# Patient Record
Sex: FEMALE | Race: WHITE | Employment: OTHER | URBAN - METROPOLITAN AREA
[De-identification: names, ages, dates, MRNs, and addresses within clinical notes are randomized per-mention and may not be internally consistent; named-entity substitution may affect disease eponyms.]

---

## 2017-05-22 ENCOUNTER — OFFICE VISIT (OUTPATIENT)
Dept: OTOLARYNGOLOGY | Facility: CLINIC | Age: 82
End: 2017-05-22

## 2017-05-22 VITALS
DIASTOLIC BLOOD PRESSURE: 77 MMHG | TEMPERATURE: 98 F | HEIGHT: 59 IN | WEIGHT: 137 LBS | BODY MASS INDEX: 27.62 KG/M2 | SYSTOLIC BLOOD PRESSURE: 117 MMHG

## 2017-05-22 DIAGNOSIS — J34.1 MAXILLARY SINUS CYST: Primary | ICD-10-CM

## 2017-05-22 PROCEDURE — G0463 HOSPITAL OUTPT CLINIC VISIT: HCPCS | Performed by: OTOLARYNGOLOGY

## 2017-05-22 PROCEDURE — 99214 OFFICE O/P EST MOD 30 MIN: CPT | Performed by: OTOLARYNGOLOGY

## 2017-05-23 NOTE — PROGRESS NOTES
Cathy Jordan is a 80year old female. Patient presents with:   Follow - Up: regarding maxillary sinus cyst, pt is doing well       HISTORY OF PRESENT ILLNESS  I have seen her in the past for allergic rhinitis and previous CT scan demonstrating a kavon Negative Blurred vision and vision changes. Respiratory Negative Dyspnea and wheezing. Cardio Negative Chest pain, irregular heartbeat/palpitations and syncope. GI Negative Abdominal pain and diarrhea.    Endocrine Negative Cold intolerance and heat i mouth., Disp: , Rfl:   •  ferrous sulfate 325 (65 FE) MG Oral Tab EC, Take 325 mg by mouth., Disp: , Rfl:   •  Montelukast Sodium 10 MG Oral Tab, Take 1 tablet (10 mg total) by mouth nightly., Disp: 30 tablet, Rfl: 3  •  loratadine 10 MG Oral Tab, Take 1 t

## 2017-05-23 NOTE — PATIENT INSTRUCTIONS
Understanding Nasal Anatomy: Inside View  A lot happens under the surface of the nose. The bone and cartilage under the skin give the nose most of its size and shape. Other structures inside and behind the nose help you breathe.  Learning the anatomy of t Bone supports the upper third (bridge) of the nose. The upper cartilage supports the side of the nose. The lower cartilage adds support, width, and height. It helps shape the nostrils and the tip of the nose.  Skin also helps shape the nose.          The na The nasal cavity is a hollow space behind the nose that air flows through. The septum is a thin \"wall\" made of cartilage and bone. It divides the inside of the nose into two chambers.  The mucous membrane is thin tissue that lines the nose, sinuses, and t

## 2017-09-11 RX ORDER — MONTELUKAST SODIUM 10 MG/1
10 TABLET ORAL NIGHTLY
Qty: 30 TABLET | Refills: 2 | Status: ON HOLD | OUTPATIENT
Start: 2017-09-11 | End: 2019-01-01

## 2017-09-11 RX ORDER — LORATADINE 10 MG/1
10 TABLET ORAL DAILY
Qty: 30 TABLET | Refills: 2 | Status: ON HOLD | OUTPATIENT
Start: 2017-09-11 | End: 2019-01-01

## 2018-03-01 RX ORDER — FLUTICASONE PROPIONATE 50 MCG
1 SPRAY, SUSPENSION (ML) NASAL 2 TIMES DAILY
Qty: 16 G | Refills: 2 | Status: ON HOLD | OUTPATIENT
Start: 2018-03-01 | End: 2019-01-01

## 2018-11-27 ENCOUNTER — HOSPITAL ENCOUNTER (OUTPATIENT)
Dept: ULTRASOUND IMAGING | Facility: HOSPITAL | Age: 83
Discharge: HOME | End: 2018-11-27
Attending: FAMILY MEDICINE
Payer: MEDICARE

## 2018-11-27 DIAGNOSIS — R55 SYNCOPE, UNSPECIFIED SYNCOPE TYPE: ICD-10-CM

## 2018-11-27 PROCEDURE — 93880 EXTRACRANIAL BILAT STUDY: CPT | Performed by: FAMILY MEDICINE

## 2019-01-01 ENCOUNTER — HOSPITAL ENCOUNTER (INPATIENT)
Facility: HOSPITAL | Age: 84
LOS: 4 days | Discharge: NURSING, REHAB OR LONG-TERM CARE | DRG: 505 | End: 2019-01-01
Attending: HOSPITALIST | Admitting: HOSPITALIST
Payer: MEDICARE

## 2019-01-01 ENCOUNTER — APPOINTMENT (OUTPATIENT)
Dept: GENERAL RADIOLOGY | Age: 84
End: 2019-01-01
Attending: FAMILY MEDICINE
Payer: MEDICARE

## 2019-01-01 ENCOUNTER — APPOINTMENT (OUTPATIENT)
Dept: GENERAL RADIOLOGY | Facility: HOSPITAL | Age: 84
DRG: 505 | End: 2019-01-01
Attending: ORTHOPAEDIC SURGERY
Payer: MEDICARE

## 2019-01-01 ENCOUNTER — SNF VISIT (OUTPATIENT)
Dept: INTERNAL MEDICINE CLINIC | Age: 84
End: 2019-01-01

## 2019-01-01 ENCOUNTER — ANESTHESIA EVENT (OUTPATIENT)
Dept: SURGERY | Facility: HOSPITAL | Age: 84
DRG: 505 | End: 2019-01-01
Payer: MEDICARE

## 2019-01-01 ENCOUNTER — SNF ADMIT/H&P (OUTPATIENT)
Dept: FAMILY MEDICINE CLINIC | Facility: CLINIC | Age: 84
End: 2019-01-01

## 2019-01-01 ENCOUNTER — HOSPITAL ENCOUNTER (OUTPATIENT)
Age: 84
Discharge: HOME | End: 2019-01-01
Attending: FAMILY MEDICINE
Payer: MEDICARE

## 2019-01-01 ENCOUNTER — MED REC SCAN ONLY (OUTPATIENT)
Dept: FAMILY MEDICINE CLINIC | Facility: CLINIC | Age: 84
End: 2019-01-01

## 2019-01-01 ENCOUNTER — MOBILE ENCOUNTER (OUTPATIENT)
Dept: FAMILY MEDICINE CLINIC | Facility: CLINIC | Age: 84
End: 2019-01-01

## 2019-01-01 ENCOUNTER — ANESTHESIA (OUTPATIENT)
Dept: SURGERY | Facility: HOSPITAL | Age: 84
DRG: 505 | End: 2019-01-01
Payer: MEDICARE

## 2019-01-01 VITALS — TEMPERATURE: 98 F | DIASTOLIC BLOOD PRESSURE: 77 MMHG | HEART RATE: 80 BPM | SYSTOLIC BLOOD PRESSURE: 143 MMHG

## 2019-01-01 VITALS
RESPIRATION RATE: 18 BRPM | OXYGEN SATURATION: 92 % | SYSTOLIC BLOOD PRESSURE: 143 MMHG | DIASTOLIC BLOOD PRESSURE: 84 MMHG | HEART RATE: 85 BPM | TEMPERATURE: 99 F

## 2019-01-01 VITALS
TEMPERATURE: 98 F | OXYGEN SATURATION: 97 % | DIASTOLIC BLOOD PRESSURE: 68 MMHG | HEART RATE: 80 BPM | RESPIRATION RATE: 18 BRPM | SYSTOLIC BLOOD PRESSURE: 130 MMHG

## 2019-01-01 VITALS
HEIGHT: 59 IN | OXYGEN SATURATION: 99 % | SYSTOLIC BLOOD PRESSURE: 172 MMHG | BODY MASS INDEX: 27.42 KG/M2 | TEMPERATURE: 97 F | RESPIRATION RATE: 14 BRPM | HEART RATE: 99 BPM | DIASTOLIC BLOOD PRESSURE: 92 MMHG | WEIGHT: 136 LBS

## 2019-01-01 DIAGNOSIS — Z74.09 IMPAIRED PHYSICAL MOBILITY: ICD-10-CM

## 2019-01-01 DIAGNOSIS — S92.001S CLOSED DISPLACED FRACTURE OF RIGHT CALCANEUS, UNSPECIFIED PORTION OF CALCANEUS, SEQUELA: ICD-10-CM

## 2019-01-01 DIAGNOSIS — S92.031D: Primary | ICD-10-CM

## 2019-01-01 DIAGNOSIS — Z47.89 ORTHOPEDIC AFTERCARE: Primary | ICD-10-CM

## 2019-01-01 DIAGNOSIS — Z98.890 S/P ORIF (OPEN REDUCTION INTERNAL FIXATION) FRACTURE: ICD-10-CM

## 2019-01-01 DIAGNOSIS — R03.0 ELEVATED BLOOD PRESSURE READING: ICD-10-CM

## 2019-01-01 DIAGNOSIS — Z87.81 S/P ORIF (OPEN REDUCTION INTERNAL FIXATION) FRACTURE: ICD-10-CM

## 2019-01-01 DIAGNOSIS — R26.89 IMPAIRED WEIGHT BEARING: Primary | ICD-10-CM

## 2019-01-01 DIAGNOSIS — I10 BENIGN ESSENTIAL HTN: ICD-10-CM

## 2019-01-01 DIAGNOSIS — S82.891A CLOSED FRACTURE OF RIGHT ANKLE, INITIAL ENCOUNTER: Primary | ICD-10-CM

## 2019-01-01 LAB
ANION GAP SERPL CALC-SCNC: 11 MMOL/L (ref 0–18)
ANION GAP SERPL CALC-SCNC: 7 MMOL/L (ref 0–18)
ANION GAP SERPL CALC-SCNC: 7 MMOL/L (ref 0–18)
ATRIAL RATE: 74 BPM
BASOPHILS # BLD AUTO: 0.01 X10(3) UL (ref 0–0.2)
BASOPHILS # BLD AUTO: 0.03 X10(3) UL (ref 0–0.2)
BASOPHILS NFR BLD AUTO: 0.1 %
BASOPHILS NFR BLD AUTO: 0.4 %
BUN BLD-MCNC: 11 MG/DL (ref 7–18)
BUN BLD-MCNC: 15 MG/DL (ref 7–18)
BUN BLD-MCNC: 9 MG/DL (ref 7–18)
BUN/CREAT SERPL: 15.8 (ref 10–20)
BUN/CREAT SERPL: 18.6 (ref 10–20)
BUN/CREAT SERPL: 30 (ref 10–20)
CALCIUM BLD-MCNC: 8.4 MG/DL (ref 8.5–10.1)
CALCIUM BLD-MCNC: 8.4 MG/DL (ref 8.5–10.1)
CALCIUM BLD-MCNC: 9.2 MG/DL (ref 8.5–10.1)
CHLORIDE SERPL-SCNC: 105 MMOL/L (ref 98–107)
CHLORIDE SERPL-SCNC: 107 MMOL/L (ref 98–107)
CHLORIDE SERPL-SCNC: 99 MMOL/L (ref 98–107)
CO2 SERPL-SCNC: 27 MMOL/L (ref 21–32)
CO2 SERPL-SCNC: 28 MMOL/L (ref 21–32)
CO2 SERPL-SCNC: 28 MMOL/L (ref 21–32)
CREAT BLD-MCNC: 0.5 MG/DL (ref 0.55–1.02)
CREAT BLD-MCNC: 0.57 MG/DL (ref 0.55–1.02)
CREAT BLD-MCNC: 0.59 MG/DL (ref 0.55–1.02)
DEPRECATED RDW RBC AUTO: 42.5 FL (ref 35.1–46.3)
DEPRECATED RDW RBC AUTO: 44.2 FL (ref 35.1–46.3)
DEPRECATED RDW RBC AUTO: 45.7 FL (ref 35.1–46.3)
EOSINOPHIL # BLD AUTO: 0.07 X10(3) UL (ref 0–0.7)
EOSINOPHIL # BLD AUTO: 0.3 X10(3) UL (ref 0–0.7)
EOSINOPHIL NFR BLD AUTO: 0.9 %
EOSINOPHIL NFR BLD AUTO: 4.2 %
ERYTHROCYTE [DISTWIDTH] IN BLOOD BY AUTOMATED COUNT: 13.2 % (ref 11–15)
ERYTHROCYTE [DISTWIDTH] IN BLOOD BY AUTOMATED COUNT: 13.5 % (ref 11–15)
ERYTHROCYTE [DISTWIDTH] IN BLOOD BY AUTOMATED COUNT: 13.7 % (ref 11–15)
GLUCOSE BLD-MCNC: 103 MG/DL (ref 70–99)
GLUCOSE BLD-MCNC: 86 MG/DL (ref 70–99)
GLUCOSE BLD-MCNC: 90 MG/DL (ref 70–99)
HCT VFR BLD AUTO: 35.7 % (ref 35–48)
HCT VFR BLD AUTO: 37.4 % (ref 35–48)
HCT VFR BLD AUTO: 39.6 % (ref 35–48)
HGB BLD-MCNC: 12.2 G/DL (ref 12–16)
HGB BLD-MCNC: 12.6 G/DL (ref 12–16)
HGB BLD-MCNC: 13.9 G/DL (ref 12–16)
IMM GRANULOCYTES # BLD AUTO: 0.02 X10(3) UL (ref 0–1)
IMM GRANULOCYTES # BLD AUTO: 0.03 X10(3) UL (ref 0–1)
IMM GRANULOCYTES NFR BLD: 0.3 %
IMM GRANULOCYTES NFR BLD: 0.4 %
INR BLD: 1.01 (ref 0.9–1.1)
LYMPHOCYTES # BLD AUTO: 1.27 X10(3) UL (ref 1–4)
LYMPHOCYTES # BLD AUTO: 1.54 X10(3) UL (ref 1–4)
LYMPHOCYTES NFR BLD AUTO: 16.7 %
LYMPHOCYTES NFR BLD AUTO: 21.3 %
MCH RBC QN AUTO: 30.7 PG (ref 26–34)
MCH RBC QN AUTO: 30.8 PG (ref 26–34)
MCH RBC QN AUTO: 31 PG (ref 26–34)
MCHC RBC AUTO-ENTMCNC: 33.7 G/DL (ref 31–37)
MCHC RBC AUTO-ENTMCNC: 34.2 G/DL (ref 31–37)
MCHC RBC AUTO-ENTMCNC: 35.1 G/DL (ref 31–37)
MCV RBC AUTO: 88.2 FL (ref 80–100)
MCV RBC AUTO: 89.7 FL (ref 80–100)
MCV RBC AUTO: 91.4 FL (ref 80–100)
MONOCYTES # BLD AUTO: 0.67 X10(3) UL (ref 0.1–1)
MONOCYTES # BLD AUTO: 0.74 X10(3) UL (ref 0.1–1)
MONOCYTES NFR BLD AUTO: 10.2 %
MONOCYTES NFR BLD AUTO: 8.8 %
NEUTROPHILS # BLD AUTO: 4.58 X10 (3) UL (ref 1.5–7.7)
NEUTROPHILS # BLD AUTO: 4.58 X10(3) UL (ref 1.5–7.7)
NEUTROPHILS # BLD AUTO: 5.57 X10 (3) UL (ref 1.5–7.7)
NEUTROPHILS # BLD AUTO: 5.57 X10(3) UL (ref 1.5–7.7)
NEUTROPHILS NFR BLD AUTO: 63.5 %
NEUTROPHILS NFR BLD AUTO: 73.2 %
OSMOLALITY SERPL CALC.SUM OF ELEC: 287 MOSM/KG (ref 275–295)
OSMOLALITY SERPL CALC.SUM OF ELEC: 289 MOSM/KG (ref 275–295)
OSMOLALITY SERPL CALC.SUM OF ELEC: 290 MOSM/KG (ref 275–295)
P AXIS: -8 DEGREES
P-R INTERVAL: 158 MS
PLATELET # BLD AUTO: 180 10(3)UL (ref 150–450)
PLATELET # BLD AUTO: 181 10(3)UL (ref 150–450)
PLATELET # BLD AUTO: 205 10(3)UL (ref 150–450)
POTASSIUM SERPL-SCNC: 3.1 MMOL/L (ref 3.5–5.1)
POTASSIUM SERPL-SCNC: 3.9 MMOL/L (ref 3.5–5.1)
PSA SERPL DL<=0.01 NG/ML-MCNC: 13.7 SECONDS (ref 12.5–14.7)
Q-T INTERVAL: 440 MS
QRS DURATION: 122 MS
QTC CALCULATION (BEZET): 488 MS
R AXIS: -73 DEGREES
RBC # BLD AUTO: 3.98 X10(6)UL (ref 3.8–5.3)
RBC # BLD AUTO: 4.09 X10(6)UL (ref 3.8–5.3)
RBC # BLD AUTO: 4.49 X10(6)UL (ref 3.8–5.3)
SODIUM SERPL-SCNC: 138 MMOL/L (ref 136–145)
SODIUM SERPL-SCNC: 140 MMOL/L (ref 136–145)
SODIUM SERPL-SCNC: 141 MMOL/L (ref 136–145)
T AXIS: 37 DEGREES
VENTRICULAR RATE: 74 BPM
WBC # BLD AUTO: 7.2 X10(3) UL (ref 4–11)
WBC # BLD AUTO: 7.6 X10(3) UL (ref 4–11)
WBC # BLD AUTO: 7.8 X10(3) UL (ref 4–11)

## 2019-01-01 PROCEDURE — 99306 1ST NF CARE HIGH MDM 50: CPT | Performed by: FAMILY MEDICINE

## 2019-01-01 PROCEDURE — 73560 X-RAY EXAM OF KNEE 1 OR 2: CPT | Performed by: FAMILY MEDICINE

## 2019-01-01 PROCEDURE — 99239 HOSP IP/OBS DSCHRG MGMT >30: CPT | Performed by: INTERNAL MEDICINE

## 2019-01-01 PROCEDURE — 73502 X-RAY EXAM HIP UNI 2-3 VIEWS: CPT | Performed by: FAMILY MEDICINE

## 2019-01-01 PROCEDURE — 76000 FLUOROSCOPY <1 HR PHYS/QHP: CPT | Performed by: ORTHOPAEDIC SURGERY

## 2019-01-01 PROCEDURE — 99205 OFFICE O/P NEW HI 60 MIN: CPT

## 2019-01-01 PROCEDURE — 99232 SBSQ HOSP IP/OBS MODERATE 35: CPT | Performed by: INTERNAL MEDICINE

## 2019-01-01 PROCEDURE — 99310 SBSQ NF CARE HIGH MDM 45: CPT | Performed by: NURSE PRACTITIONER

## 2019-01-01 PROCEDURE — 0QSL04Z REPOSITION RIGHT TARSAL WITH INTERNAL FIXATION DEVICE, OPEN APPROACH: ICD-10-PCS | Performed by: ORTHOPAEDIC SURGERY

## 2019-01-01 PROCEDURE — 99232 SBSQ HOSP IP/OBS MODERATE 35: CPT | Performed by: STUDENT IN AN ORGANIZED HEALTH CARE EDUCATION/TRAINING PROGRAM

## 2019-01-01 PROCEDURE — 73110 X-RAY EXAM OF WRIST: CPT | Performed by: FAMILY MEDICINE

## 2019-01-01 PROCEDURE — 3E0T3BZ INTRODUCTION OF ANESTHETIC AGENT INTO PERIPHERAL NERVES AND PLEXI, PERCUTANEOUS APPROACH: ICD-10-PCS | Performed by: INTERNAL MEDICINE

## 2019-01-01 PROCEDURE — 99223 1ST HOSP IP/OBS HIGH 75: CPT | Performed by: HOSPITALIST

## 2019-01-01 PROCEDURE — 73610 X-RAY EXAM OF ANKLE: CPT | Performed by: FAMILY MEDICINE

## 2019-01-01 PROCEDURE — 29515 APPLICATION SHORT LEG SPLINT: CPT

## 2019-01-01 PROCEDURE — 99307 SBSQ NF CARE SF MDM 10: CPT | Performed by: NURSE PRACTITIONER

## 2019-01-01 PROCEDURE — 99215 OFFICE O/P EST HI 40 MIN: CPT

## 2019-01-01 RX ORDER — PSEUDOEPHEDRINE HCL 30 MG
TABLET ORAL
Qty: 60 CAPSULE | Refills: 0 | Status: SHIPPED | COMMUNITY
Start: 2019-01-01

## 2019-01-01 RX ORDER — SODIUM CHLORIDE, SODIUM LACTATE, POTASSIUM CHLORIDE, CALCIUM CHLORIDE 600; 310; 30; 20 MG/100ML; MG/100ML; MG/100ML; MG/100ML
INJECTION, SOLUTION INTRAVENOUS CONTINUOUS
Status: DISCONTINUED | OUTPATIENT
Start: 2019-01-01 | End: 2019-01-01 | Stop reason: HOSPADM

## 2019-01-01 RX ORDER — ASPIRIN 325 MG
325 TABLET ORAL 2 TIMES DAILY
Qty: 28 TABLET | Refills: 0 | Status: SHIPPED | OUTPATIENT
Start: 2019-01-01 | End: 2019-01-01

## 2019-01-01 RX ORDER — BISACODYL 10 MG
10 SUPPOSITORY, RECTAL RECTAL
Status: DISCONTINUED | OUTPATIENT
Start: 2019-01-01 | End: 2019-01-01

## 2019-01-01 RX ORDER — CEFAZOLIN SODIUM/WATER 2 G/20 ML
2 SYRINGE (ML) INTRAVENOUS EVERY 8 HOURS
Status: COMPLETED | OUTPATIENT
Start: 2019-01-01 | End: 2019-01-01

## 2019-01-01 RX ORDER — ENALAPRIL MALEATE AND HYDROCHLOROTHIAZIDE 10; 25 MG/1; MG/1
1 TABLET ORAL DAILY
Status: DISCONTINUED | OUTPATIENT
Start: 2019-01-01 | End: 2019-01-01

## 2019-01-01 RX ORDER — POLYETHYLENE GLYCOL 3350 17 G/17G
17 POWDER, FOR SOLUTION ORAL DAILY PRN
Status: DISCONTINUED | OUTPATIENT
Start: 2019-01-01 | End: 2019-01-01

## 2019-01-01 RX ORDER — POTASSIUM CHLORIDE 14.9 MG/ML
20 INJECTION INTRAVENOUS ONCE
Status: COMPLETED | OUTPATIENT
Start: 2019-01-01 | End: 2019-01-01

## 2019-01-01 RX ORDER — TRAMADOL HYDROCHLORIDE 50 MG/1
50 TABLET ORAL EVERY 4 HOURS PRN
Status: DISCONTINUED | OUTPATIENT
Start: 2019-01-01 | End: 2019-01-01

## 2019-01-01 RX ORDER — NALOXONE HYDROCHLORIDE 0.4 MG/ML
80 INJECTION, SOLUTION INTRAMUSCULAR; INTRAVENOUS; SUBCUTANEOUS AS NEEDED
Status: DISCONTINUED | OUTPATIENT
Start: 2019-01-01 | End: 2019-01-01 | Stop reason: HOSPADM

## 2019-01-01 RX ORDER — ACETAMINOPHEN 325 MG/1
650 TABLET ORAL ONCE
Status: COMPLETED | OUTPATIENT
Start: 2019-01-01 | End: 2019-01-01

## 2019-01-01 RX ORDER — TRAMADOL HYDROCHLORIDE 50 MG/1
50 TABLET ORAL EVERY 6 HOURS PRN
Qty: 12 TABLET | Refills: 0 | Status: ON HOLD | OUTPATIENT
Start: 2019-01-01 | End: 2020-01-01

## 2019-01-01 RX ORDER — DOCUSATE SODIUM 100 MG/1
100 CAPSULE, LIQUID FILLED ORAL 2 TIMES DAILY
Status: DISCONTINUED | OUTPATIENT
Start: 2019-01-01 | End: 2019-01-01

## 2019-01-01 RX ORDER — ACETAMINOPHEN 325 MG/1
325 TABLET ORAL EVERY 4 HOURS PRN
Status: DISCONTINUED | OUTPATIENT
Start: 2019-01-01 | End: 2019-01-01

## 2019-01-01 RX ORDER — HYDROMORPHONE HYDROCHLORIDE 1 MG/ML
0.4 INJECTION, SOLUTION INTRAMUSCULAR; INTRAVENOUS; SUBCUTANEOUS EVERY 5 MIN PRN
Status: DISCONTINUED | OUTPATIENT
Start: 2019-01-01 | End: 2019-01-01 | Stop reason: HOSPADM

## 2019-01-01 RX ORDER — MORPHINE SULFATE 4 MG/ML
4 INJECTION, SOLUTION INTRAMUSCULAR; INTRAVENOUS EVERY 2 HOUR PRN
Status: DISCONTINUED | OUTPATIENT
Start: 2019-01-01 | End: 2019-01-01

## 2019-01-01 RX ORDER — SODIUM CHLORIDE 9 MG/ML
INJECTION, SOLUTION INTRAVENOUS CONTINUOUS
Status: DISCONTINUED | OUTPATIENT
Start: 2019-01-01 | End: 2019-01-01

## 2019-01-01 RX ORDER — ONDANSETRON 2 MG/ML
4 INJECTION INTRAMUSCULAR; INTRAVENOUS AS NEEDED
Status: DISCONTINUED | OUTPATIENT
Start: 2019-01-01 | End: 2019-01-01 | Stop reason: HOSPADM

## 2019-01-01 RX ORDER — POLYETHYLENE GLYCOL 3350 17 G/17G
17 POWDER, FOR SOLUTION ORAL DAILY PRN
Qty: 10 EACH | Refills: 0 | Status: SHIPPED | COMMUNITY
Start: 2019-01-01

## 2019-01-01 RX ORDER — ACETAMINOPHEN 325 MG/1
650 TABLET ORAL EVERY 6 HOURS PRN
Status: DISCONTINUED | OUTPATIENT
Start: 2019-01-01 | End: 2019-01-01

## 2019-01-01 RX ORDER — FLUTICASONE PROPIONATE 50 MCG
1 SPRAY, SUSPENSION (ML) NASAL 2 TIMES DAILY
Status: DISCONTINUED | OUTPATIENT
Start: 2019-01-01 | End: 2019-01-01

## 2019-01-01 RX ORDER — HYDRALAZINE HYDROCHLORIDE 20 MG/ML
10 INJECTION INTRAMUSCULAR; INTRAVENOUS EVERY 6 HOURS PRN
Status: DISCONTINUED | OUTPATIENT
Start: 2019-01-01 | End: 2019-01-01

## 2019-01-01 RX ORDER — POTASSIUM CHLORIDE 20 MEQ/1
40 TABLET, EXTENDED RELEASE ORAL EVERY 4 HOURS
Status: DISCONTINUED | OUTPATIENT
Start: 2019-01-01 | End: 2019-01-01

## 2019-01-01 RX ORDER — CEFAZOLIN SODIUM 1 G/3ML
INJECTION, POWDER, FOR SOLUTION INTRAMUSCULAR; INTRAVENOUS
Status: DISCONTINUED | OUTPATIENT
Start: 2019-01-01 | End: 2019-01-01 | Stop reason: HOSPADM

## 2019-01-01 RX ORDER — ACETAMINOPHEN 325 MG/1
650 TABLET ORAL EVERY 4 HOURS PRN
Status: DISCONTINUED | OUTPATIENT
Start: 2019-01-01 | End: 2019-01-01

## 2019-01-01 RX ORDER — MORPHINE SULFATE 4 MG/ML
2 INJECTION, SOLUTION INTRAMUSCULAR; INTRAVENOUS EVERY 2 HOUR PRN
Status: DISCONTINUED | OUTPATIENT
Start: 2019-01-01 | End: 2019-01-01

## 2019-01-01 RX ORDER — SODIUM CHLORIDE, SODIUM LACTATE, POTASSIUM CHLORIDE, CALCIUM CHLORIDE 600; 310; 30; 20 MG/100ML; MG/100ML; MG/100ML; MG/100ML
INJECTION, SOLUTION INTRAVENOUS CONTINUOUS
Status: DISCONTINUED | OUTPATIENT
Start: 2019-01-01 | End: 2019-01-01

## 2019-01-01 RX ORDER — ONDANSETRON 2 MG/ML
4 INJECTION INTRAMUSCULAR; INTRAVENOUS EVERY 6 HOURS PRN
Status: DISCONTINUED | OUTPATIENT
Start: 2019-01-01 | End: 2019-01-01

## 2019-01-01 RX ORDER — ASPIRIN 325 MG
325 TABLET ORAL 2 TIMES DAILY
Status: DISCONTINUED | OUTPATIENT
Start: 2019-01-01 | End: 2019-01-01

## 2019-01-01 RX ORDER — MONTELUKAST SODIUM 10 MG/1
10 TABLET ORAL NIGHTLY
Status: DISCONTINUED | OUTPATIENT
Start: 2019-01-01 | End: 2019-01-01

## 2019-01-01 RX ORDER — MORPHINE SULFATE 4 MG/ML
1 INJECTION, SOLUTION INTRAMUSCULAR; INTRAVENOUS EVERY 2 HOUR PRN
Status: DISCONTINUED | OUTPATIENT
Start: 2019-01-01 | End: 2019-01-01

## 2019-01-01 RX ORDER — SODIUM PHOSPHATE, DIBASIC AND SODIUM PHOSPHATE, MONOBASIC 7; 19 G/133ML; G/133ML
1 ENEMA RECTAL ONCE AS NEEDED
Status: DISCONTINUED | OUTPATIENT
Start: 2019-01-01 | End: 2019-01-01

## 2019-01-01 RX ORDER — METOCLOPRAMIDE HYDROCHLORIDE 5 MG/ML
5 INJECTION INTRAMUSCULAR; INTRAVENOUS EVERY 8 HOURS PRN
Status: DISCONTINUED | OUTPATIENT
Start: 2019-01-01 | End: 2019-01-01

## 2019-01-04 ENCOUNTER — OFFICE VISIT (OUTPATIENT)
Dept: OTOLARYNGOLOGY | Facility: CLINIC | Age: 84
End: 2019-01-04
Payer: MEDICARE

## 2019-01-04 ENCOUNTER — TELEPHONE (OUTPATIENT)
Dept: OTOLARYNGOLOGY | Facility: CLINIC | Age: 84
End: 2019-01-04

## 2019-01-04 VITALS
WEIGHT: 137 LBS | HEIGHT: 58 IN | BODY MASS INDEX: 28.76 KG/M2 | TEMPERATURE: 98 F | DIASTOLIC BLOOD PRESSURE: 79 MMHG | SYSTOLIC BLOOD PRESSURE: 149 MMHG

## 2019-01-04 DIAGNOSIS — J34.1 MAXILLARY SINUS CYST: Primary | ICD-10-CM

## 2019-01-04 DIAGNOSIS — R26.89 IMBALANCE: ICD-10-CM

## 2019-01-04 PROCEDURE — 99214 OFFICE O/P EST MOD 30 MIN: CPT | Performed by: OTOLARYNGOLOGY

## 2019-01-04 PROCEDURE — G0463 HOSPITAL OUTPT CLINIC VISIT: HCPCS | Performed by: OTOLARYNGOLOGY

## 2019-01-04 RX ORDER — LORATADINE 10 MG/1
10 TABLET ORAL DAILY
Qty: 30 TABLET | Refills: 3 | Status: ON HOLD | OUTPATIENT
Start: 2019-01-04 | End: 2019-01-01

## 2019-01-04 RX ORDER — MONTELUKAST SODIUM 10 MG/1
10 TABLET ORAL NIGHTLY
Qty: 30 TABLET | Refills: 3 | Status: ON HOLD | OUTPATIENT
Start: 2019-01-04 | End: 2019-01-01

## 2019-01-04 RX ORDER — FLUTICASONE PROPIONATE 50 MCG
1 SPRAY, SUSPENSION (ML) NASAL 2 TIMES DAILY
Qty: 1 BOTTLE | Refills: 3 | Status: ON HOLD | OUTPATIENT
Start: 2019-01-04 | End: 2019-01-01

## 2019-01-04 NOTE — TELEPHONE ENCOUNTER
Per Dr. Hernandez  note\" I will start her on Singulair loratadine and fluticasone as these have helped with postnasal discharge and nasal issues in the past.  I did give her vestibular exercises to start as well return to see me if no improvement in her greyson

## 2019-01-04 NOTE — PROGRESS NOTES
Jailigia Rodriguez is a 80year old female.   Patient presents with:  Dizziness: balance issues and lightheaded since Friday       HISTORY OF PRESENT ILLNESS  I have been following her for years for stable maxillary sinuses found incidentally after CT scan Negative Chest pain, irregular heartbeat/palpitations and syncope. GI Negative Abdominal pain and diarrhea. Endocrine Negative Cold intolerance and heat intolerance. Neuro Negative Tremors. Psych Negative Anxiety and depression.    Integumentary Neg Disp: 30 tablet, Rfl: 2  •  Calcium Carbonate-Vitamin D (CALCIUM 500 + D) 500-125 MG-UNIT Oral Tab, Take by mouth., Disp: , Rfl:   •  ferrous sulfate 325 (65 FE) MG Oral Tab EC, Take 325 mg by mouth., Disp: , Rfl:   •  Fluticasone Propionate (FLONASE) 48 M

## 2019-03-01 PROBLEM — S82.899A ANKLE FRACTURE: Status: ACTIVE | Noted: 2019-01-01

## 2019-03-01 NOTE — ED INITIAL ASSESSMENT (HPI)
Per pt fell, denies any dizziness reports not sure what she slipped on. Pt reports pain to right hip, knee and ankle pain. Denies any LOC or head injury.  Pt with abrasions to left knee

## 2019-03-02 NOTE — ANESTHESIA PREPROCEDURE EVALUATION
PRE-OP EVALUATION    Patient Name: Bria Kim    Pre-op Diagnosis: Calcaneal fracture [S92.009A]    Procedure(s):   RIGHT CALCANEAL OPEN REDUCTION INTERNAL FIXATION    Surgeon(s) and Role:     Kathleen Dalh MD - Primary    Pre-op vitals reviewe Oral Tab Take 1 tablet by mouth nightly. Disp: 30 tablet Rfl: 11   loratadine (CLARITIN) 10 MG Oral Tab Take 1 tablet by mouth daily. Disp: 30 tablet Rfl: 11       Allergies: Blue Dye; Iodine  [Radiology Contrast Iodinated Dyes];  Sesame Oil      Anesthesia discussed with surgeon and patient. Comment: GETA/LMA discussed in detail. Risk of complications discussed including but not limited to sore throat, cough, PONV discussed.  Also, discussed risks including dental injury particularly on any weakened, saritha

## 2019-03-02 NOTE — ANESTHESIA POSTPROCEDURE EVALUATION
[address] [place] [address] [address] Patient Status:  Inpatient   Age/Gender 80year old female MRN [id number]   [facility] SURGERY Attending Farrah Sheets MD   Hosp Day # 1 PCP Rodolfo Alvarenga MD       Anesthesia Post-op Note    Procedure

## 2019-03-02 NOTE — PHYSICAL THERAPY NOTE
PT orders received, however pt has an orthopedic consult in place with mention of possible surgery. Will hold PT eval until ortho consult is completed, plan of care revealed and activity orders are in place with WB restrictions.  HERNANDO

## 2019-03-02 NOTE — OCCUPATIONAL THERAPY NOTE
OT orders received, however RN states that pt will be going to surgery this am. Therefore, will DC current OT orders. Post sx, pt will require new OT orders.

## 2019-03-02 NOTE — PHYSICAL THERAPY NOTE
PT orders received, however spoke with RN this am and RN states that pt will be going to surgery this am. Therefore, will DC current PT orders. Pt will require new PT orders as well as any WB restrictions and will need activity orders. RN notified.  CM

## 2019-03-03 NOTE — PROGRESS NOTES
Post Op Day 1 Ortho Note    Status Post Nerve Block:  Type of Nerve Block: Right popliteal  Single Injection Nerve Block    Post op review: No evidence of immediate block related complications, No paresthesia noted, Able to lift leg(s) and Patients with lo

## 2019-03-03 NOTE — OCCUPATIONAL THERAPY NOTE
OCCUPATIONAL THERAPY EVALUATION - INPATIENT     Room Number: 385/385-A  Evaluation Date: 3/3/2019  Type of Evaluation: Initial  Presenting Problem: Ankle fracture    Physician Order: IP Consult to Occupational Therapy  Reason for Therapy: ADL/IADL Dysfunct COGNITION  Overall Cognitive Status:  WFL - within functional limits    VISION  Current Vision: wears glasses for distance only    PERCEPTION  Overall Perception Status:   WFL - within functional limits    SENSATION  WFL    Communication: Pt able to co bearing precautions. Pt completed stand pivot t/f to chair with use of RW, and CGA while maintaining NWB status on R LE. Discussed with pt current d/c recommendations, pt verbalizing agreement, all additional questions addressed.    Patient End of Session: 16-19 days)  OT Device Recommendations: TBD    PLAN  OT Treatment Plan: Balance activities; Energy conservation/work simplification techniques;ADL training;IADL training;Functional transfer training;Patient/Family education;Patient/Family training; Compensat

## 2019-03-03 NOTE — CM/SW NOTE
Call from RN that [facility] is recommended. [facility] list given by RN. Right ORIF ankle 3/2. Pt lives alone    SW spoke with pt- she gave phone to [facility] [phone]. Discussed process for making [facility] choice. Discussed area SARs.   They would like referral

## 2019-03-03 NOTE — PROGRESS NOTES
03/03/19 1316   Clinical Encounter Type   Visited With Patient and family together   Routine Visit Introduction   Continue Visiting No   Patient Spiritual Encounters   Spiritual Interventions  provided spiritual support through caring presence a

## 2019-03-03 NOTE — PROGRESS NOTES
Received pt from PACU. A&O. On room air. No c/o pain. Post mold with ace wrap drsg C/D/I. Elevated on pillows. Regular diet ordered. No c/o nausea. Will continue to monitor.

## 2019-03-04 NOTE — CM/SW NOTE
Pts RNEAY Daley left message this am.  I tried calling her back at [phone] x 2, with no answer. Met with pt who told me to call her dtr Tila Kenny who was just getting into town from a trip to [facility] that she cut short.  I contacted Tila Kenny at [phone]

## 2019-03-04 NOTE — DISCHARGE SUMMARY
[facility] HOSPITALIST  DISCHARGE SUMMARY     [address] Patient Status:  Inpatient    1929 MRN [id number]   [facility] 3SW-A Attending No att. providers found   Hosp Day # 4 PCP Trenton MD Eloy     Date of Admission: 3/1/2019 0     traMADol HCl 50 MG Tabs  Commonly known as:  ULTRAM  Notes to patient:  Last dose: Take 1 tablet (50 mg total) by mouth every 6 (six) hours as needed.    Quantity:  12 tablet  Refills:  0        CHANGE how you take these medications      Instru prescription for each of these medications  · traMADol HCl 50 MG Tabs     Information about where to get these medications is not yet available    Ask your nurse or doctor about these medications  · docusate sodium 100 MG Caps  · PEG 3350 Pack         ILPM DISCHARGE INSTRUCTIONS: See electronic chart    Pallavi Roper MD    Time spent:  > 30 minutes

## 2019-03-04 NOTE — PHYSICAL THERAPY NOTE
PHYSICAL THERAPY TREATMENT NOTE - INPATIENT    Room Number: 385/385-A     Session: 1   Number of Visits to Meet Established Goals: 3    Presenting Problem: fall, ORIF R ankle    Problem List  Active Problems:    Ankle fracture    Benign essential HTN    H hospital room?: A Lot   -   Climbing 3-5 steps with a railing?: A Lot       AM-PAC Score:  Raw Score: 16   Approx Degree of Impairment: 54.16%   Standardized Score (AM-PAC Scale): 40.78   CMS Modifier (G-Code): CK    FUNCTIONAL ABILITY STATUS  Gait Assessm for transfers and ambulation for safety. Discussed with pt the importance of taking her time with activities to improve safety and reduce the risk of falling. Pt in understanding.      The AM-PAC '6-Clicks' Inpatient Basic Mobility Short Form was completed

## 2019-03-04 NOTE — PROGRESS NOTES
[facility] HOSPITALIST  Progress Note     Melissa Catarina Erinn Patient Status:  Inpatient    1929 MRN [id number]   [facility] 3SW-A Attending Digna Campbell MD   Hosp Day # 3 PCP Karan Lomas MD     Chief Complaint: ankle pain    S: Geryl Ball Nightly       ASSESSMENT / PLAN:     1. Fall with subsequent right sided calcaneal fx   2.  Hypertension     Plan of care:   D/ C planning to [facility]        Quality:  · DVT Prophylaxis: SCD  · CODE status: full  · Horan: no  · Central line: no     Estimated jose

## 2019-03-04 NOTE — OCCUPATIONAL THERAPY NOTE
OCCUPATIONAL THERAPY TREATMENT NOTE - INPATIENT     Room Number: 385/385-A  Session: 1   Number of Visits to Meet Established Goals: 5    Presenting Problem: Ankle fracture    History related to current admission: Pt admitted to [facility] [place] [facility] ED on 3/1 from [facility] (G-Code): CK    FUNCTIONAL TRANSFER ASSESSMENT  Supine to Sit : Supervision  Sit to Stand: Contact guard assist    Skilled Therapy Provided: Patient alert, oriented x 4, following all motor commands.   Patient aware of NWB RLE and able to maintaining during will transfer from supine to sit:  with supervision- MET 3/4/19  Patient will transfer from sit to stand:  with supervision- progressing  Patient will transfer to toilet:  with supervisio- progressingn     Additional Goals:  Pt will demonstrate safety and

## 2019-03-04 NOTE — CM/SW NOTE
Pt's dgt Shell Peterson called from [phone]. She toured facilities and would like patient to go to [facility]. Per notes no beds at [facility], earliest Tuesday. Trudy aware.  Soterotracy Peterson reports she knows Cristhian will be on tomorrow and she will follow up in AM.

## 2019-03-05 PROBLEM — Z47.89 ORTHOPEDIC AFTERCARE: Status: ACTIVE | Noted: 2019-01-01

## 2019-03-05 NOTE — CM/SW NOTE
Informed by RN that pt cleared for d/c today. Spoke with Zenobia Santana at [address] [place] [address]. Pt accepted, 3:30PM d/c time requested. Confirmed with dtr, Christin Mcbride, that [facility] is first choice for [facility].   Discussed [employer] transport and est cost- she agrees with

## 2019-03-05 NOTE — PROGRESS NOTES
[address] [place] [address] [address] Patient Status:  Inpatient    1929 MRN [id number]   [facility] 3SW-A Attending Brynn Mace MD   Hosp Day # 4 PCP Libra Shaw MD         S:  Patient doing well. No pain currently.   No acute

## 2019-03-05 NOTE — PHYSICAL THERAPY NOTE
PHYSICAL THERAPY TREATMENT NOTE - INPATIENT    Room Number: 385/385-A     Session: 1   Number of Visits to Meet Established Goals: 3    Presenting Problem: fall, ORIF R ankle    Problem List  Active Problems:    Right Ankle fracture 05/30/2019    Benign e wheelchair)?: A Little   -   Need to walk in hospital room?: A Little   -   Climbing 3-5 steps with a railing?: A Lot       AM-PAC Score:  Raw Score: 17   Approx Degree of Impairment: 50.57%   Standardized Score (AM-PAC Scale): 42.13   CMS Modifier (G-Code Pt continues to stay below the baseline and will continue to benefit from skilled PT while in hospital so pt can maximize functional potential and achieve higher level of function.   The AM-PAC '6-Clicks' Inpatient Basic Mobility Short Form was completed an

## 2019-03-05 NOTE — CM/SW NOTE
03/05/19 1300   Discharge disposition   Expected discharge disposition Skilled Nurs   Name of Facillity/Home Care/Hospice [facility]   Patient is Discharged to a [address] [place] [address] Yes   Discharge transportation [facility]

## 2019-03-05 NOTE — PLAN OF CARE
NURSING DISCHARGE NOTE    Discharged [facility] via Wheelchair. (Violette Hernandez)  Accompanied by Family member and Support staff  Belongings Taken by patient/family.     Report given to [facility] @ [phone]    All paperwork, including script for tramadol taken

## 2019-03-06 NOTE — PROGRESS NOTES
Cici Gipson  : 1929  Age 80year old  female      CC--Patient presents with:  Nursing Home: Admitted to [facility] for subacute rehab. Calcaneal fracture      H. P.I Cici Gipson is a 80year old female who had tripped and fallen a medications. Disp: 60 capsule Rfl: 0   PEG 3350 Oral Powd Pack Take 17 g by mouth daily as needed. Over-the-counter medication. Take daily as needed for constipation.  Disp: 10 each Rfl: 0   Calcium Carbonate-Vitamin D (CALCIUM 500 + D) 500-125 MG-UNIT Oral distress  LINES, TUBES, DRAINS:  none  SKIN: no rashes, no suspicious lesions  WOUND: Right lower extremity-in dressing  EYES: PERRLA, EOMI, sclera anicteric, conjunctiva normal; there is no nystagmus, no drainage from eyes  HENT: normocephalic; normal nos

## 2019-03-07 PROBLEM — S92.031A DISPLACED AVULSION FRACTURE OF TUBEROSITY OF RIGHT CALCANEUS, INITIAL ENCOUNTER FOR CLOSED FRACTURE: Status: ACTIVE | Noted: 2019-01-01

## 2019-03-08 NOTE — PROGRESS NOTES
Trentondaniel Silverman  : 1929  Age 80year old  female patient is admitted to Facility: [facility] for [facility] [facility] date:  3/1/19  Discharge date to [facility]:  3/5/19  ELOS:  14-16 days  Anticipated discharge date:  3/14/19; patient is n treat as well.   Discussed discharging 3/14 with options of:  Staying respite until Ortho approves active range of motion with weight bearing as tolerated, discharging home with caregiver support and home health nursing/bath aid/ and PT/OT to eval and treat STATUS:  Full Code    ADVANCED CARE PLANNING TEAM: None      CURRENT MEDICATIONS     Current Outpatient Medications:  traMADol HCl 50 MG Oral Tab Take 1 tablet (50 mg total) by mouth every 6 (six) hours as needed.  Disp: 12 tablet Rfl: 0   aspirin 325 MG Or urinary incontinence; no hematuria  MUSCULOSKELETAL:---s/p ORIF to right heel  NEURO:denies seizures, denies vertigo and denies tremors  PSYCHE: no symptoms of depression or anxiety  HEMATOLOGY:denies excessive bleeding  ENDOCRINE: denies excessive thirst 03/04/2019    MOPERCENT 10.2 03/04/2019    EOPERCENT 4.2 03/04/2019    BAPERCENT 0.4 03/04/2019    NE 4.58 03/04/2019    LYMABS 1.54 03/04/2019    MOABSO 0.74 03/04/2019    EOABSO 0.30 03/04/2019    BAABSO 0.03 03/04/2019       [facility] records, note

## 2019-03-12 NOTE — PROGRESS NOTES
[address], 2/18/1929, 80year old, female    Chief Complaint:  Patient presents with:   Follow - Up  Musculoskeletal Problem  Weakness     [facility] Admit date:  3/1/19  Discharge date to [facility]:  3/5/19  ELOS:  14-16 days    Anticipated disch assist with lower body dressing. This patient will need the following durable medical equipment:  wheelchair, hospital bed and 3 in 1 commode. This patient will discharge to home with son; part-time caregiver advised while family is at work. Reggie Wiseman Pain management:  Tramadol 50mg po q 6 hrs PRN pain     HTN  VS q shift  Cardiac diet  Enalapril/HCTZ tab po daily      Anemia   Monitor for signs/symptoms of bleeding  Serial CBCs in [facility]  Ferrous Sulfate 325mg po daily      Bowel Management Regimen/Cons

## 2019-03-14 PROBLEM — S92.031D: Status: ACTIVE | Noted: 2019-01-01

## 2020-01-01 ENCOUNTER — TELEPHONE (OUTPATIENT)
Dept: HEMATOLOGY/ONCOLOGY | Facility: HOSPITAL | Age: 85
End: 2020-01-01

## 2020-01-01 ENCOUNTER — APPOINTMENT (OUTPATIENT)
Dept: CT IMAGING | Facility: HOSPITAL | Age: 85
DRG: 436 | End: 2020-01-01
Attending: EMERGENCY MEDICINE
Payer: MEDICARE

## 2020-01-01 ENCOUNTER — APPOINTMENT (OUTPATIENT)
Dept: MRI IMAGING | Facility: HOSPITAL | Age: 85
DRG: 436 | End: 2020-01-01
Attending: INTERNAL MEDICINE
Payer: MEDICARE

## 2020-01-01 ENCOUNTER — TELEPHONE (OUTPATIENT)
Dept: GASTROENTEROLOGY | Facility: CLINIC | Age: 85
End: 2020-01-01

## 2020-01-01 ENCOUNTER — HOSPITAL ENCOUNTER (OUTPATIENT)
Dept: CT IMAGING | Facility: HOSPITAL | Age: 85
Discharge: HOME | DRG: 436 | End: 2020-01-01
Attending: FAMILY MEDICINE
Payer: MEDICARE

## 2020-01-01 ENCOUNTER — APPOINTMENT (OUTPATIENT)
Dept: INTERVENTIONAL RADIOLOGY/VASCULAR | Facility: HOSPITAL | Age: 85
DRG: 436 | End: 2020-01-01
Attending: INTERNAL MEDICINE
Payer: MEDICARE

## 2020-01-01 ENCOUNTER — HOSPITAL ENCOUNTER (INPATIENT)
Facility: HOSPITAL | Age: 85
LOS: 9 days | Discharge: HOSPICE | DRG: 436 | End: 2020-01-01
Attending: EMERGENCY MEDICINE | Admitting: FAMILY MEDICINE
Payer: MEDICARE

## 2020-01-01 ENCOUNTER — APPOINTMENT (OUTPATIENT)
Dept: GENERAL RADIOLOGY | Facility: HOSPITAL | Age: 85
DRG: 436 | End: 2020-01-01
Attending: FAMILY MEDICINE
Payer: MEDICARE

## 2020-01-01 VITALS
TEMPERATURE: 98 F | BODY MASS INDEX: 28.19 KG/M2 | RESPIRATION RATE: 16 BRPM | WEIGHT: 134.31 LBS | HEART RATE: 88 BPM | SYSTOLIC BLOOD PRESSURE: 118 MMHG | DIASTOLIC BLOOD PRESSURE: 71 MMHG | HEIGHT: 58 IN | OXYGEN SATURATION: 96 %

## 2020-01-01 DIAGNOSIS — G89.3 CANCER RELATED PAIN: ICD-10-CM

## 2020-01-01 DIAGNOSIS — R10.9 ABDOMINAL PAIN: ICD-10-CM

## 2020-01-01 DIAGNOSIS — D49.89 INTRA-ABDOMINAL TUMOR: Primary | ICD-10-CM

## 2020-01-01 LAB
ALBUMIN SERPL-MCNC: 2.1 G/DL (ref 3.4–5)
ALBUMIN SERPL-MCNC: 2.2 G/DL (ref 3.4–5)
ALBUMIN SERPL-MCNC: 2.6 G/DL (ref 3.4–5)
ALBUMIN SERPL-MCNC: 2.9 G/DL (ref 3.4–5)
ALBUMIN/GLOB SERPL: 0.7 {RATIO} (ref 1–2)
ALP LIVER SERPL-CCNC: 125 U/L (ref 55–142)
ALP LIVER SERPL-CCNC: 159 U/L (ref 55–142)
ALP LIVER SERPL-CCNC: 161 U/L (ref 55–142)
ALP LIVER SERPL-CCNC: 187 U/L (ref 55–142)
ALT SERPL-CCNC: 39 U/L (ref 13–56)
ALT SERPL-CCNC: 44 U/L (ref 13–56)
ALT SERPL-CCNC: 50 U/L (ref 13–56)
ALT SERPL-CCNC: 58 U/L (ref 13–56)
ANION GAP SERPL CALC-SCNC: 5 MMOL/L (ref 0–18)
ANION GAP SERPL CALC-SCNC: 9 MMOL/L (ref 0–18)
AST SERPL-CCNC: 49 U/L (ref 15–37)
AST SERPL-CCNC: 52 U/L (ref 15–37)
AST SERPL-CCNC: 55 U/L (ref 15–37)
AST SERPL-CCNC: 72 U/L (ref 15–37)
BASOPHILS # BLD AUTO: 0.02 X10(3) UL (ref 0–0.2)
BASOPHILS # BLD AUTO: 0.03 X10(3) UL (ref 0–0.2)
BASOPHILS # BLD AUTO: 0.04 X10(3) UL (ref 0–0.2)
BASOPHILS # BLD AUTO: 0.04 X10(3) UL (ref 0–0.2)
BASOPHILS NFR BLD AUTO: 0.1 %
BASOPHILS NFR BLD AUTO: 0.3 %
BASOPHILS NFR BLD AUTO: 0.3 %
BASOPHILS NFR BLD AUTO: 0.4 %
BILIRUB DIRECT SERPL-MCNC: 0.1 MG/DL (ref 0–0.2)
BILIRUB SERPL-MCNC: 0.3 MG/DL (ref 0.1–2)
BILIRUB SERPL-MCNC: 0.4 MG/DL (ref 0.1–2)
BILIRUB SERPL-MCNC: 0.5 MG/DL (ref 0.1–2)
BILIRUB SERPL-MCNC: 0.5 MG/DL (ref 0.1–2)
BILIRUB UR QL: NEGATIVE
BUN BLD-MCNC: 10 MG/DL (ref 7–18)
BUN BLD-MCNC: 13 MG/DL (ref 7–18)
BUN BLD-MCNC: 21 MG/DL (ref 7–18)
BUN BLD-MCNC: 23 MG/DL (ref 7–18)
BUN/CREAT SERPL: 13.9 (ref 10–20)
BUN/CREAT SERPL: 17.6 (ref 10–20)
BUN/CREAT SERPL: 26.9 (ref 10–20)
BUN/CREAT SERPL: 28.8 (ref 10–20)
C DIFF TOX B STL QL: NEGATIVE
CALCIUM BLD-MCNC: 8.1 MG/DL (ref 8.5–10.1)
CALCIUM BLD-MCNC: 8.4 MG/DL (ref 8.5–10.1)
CALCIUM BLD-MCNC: 8.9 MG/DL (ref 8.5–10.1)
CALCIUM BLD-MCNC: 9.1 MG/DL (ref 8.5–10.1)
CANCER AG19-9 SERPL-ACNC: 985.3 U/ML (ref ?–37)
CHLORIDE SERPL-SCNC: 101 MMOL/L (ref 98–112)
CHLORIDE SERPL-SCNC: 105 MMOL/L (ref 98–112)
CHLORIDE SERPL-SCNC: 106 MMOL/L (ref 98–112)
CHLORIDE SERPL-SCNC: 93 MMOL/L (ref 98–112)
CLARITY UR: CLEAR
CO2 SERPL-SCNC: 27 MMOL/L (ref 21–32)
CO2 SERPL-SCNC: 27 MMOL/L (ref 21–32)
CO2 SERPL-SCNC: 28 MMOL/L (ref 21–32)
CO2 SERPL-SCNC: 32 MMOL/L (ref 21–32)
COLOR UR: YELLOW
CREAT BLD-MCNC: 0.72 MG/DL (ref 0.55–1.02)
CREAT BLD-MCNC: 0.74 MG/DL (ref 0.55–1.02)
CREAT BLD-MCNC: 0.78 MG/DL (ref 0.55–1.02)
CREAT BLD-MCNC: 0.8 MG/DL (ref 0.55–1.02)
DEPRECATED RDW RBC AUTO: 40.7 FL (ref 35.1–46.3)
DEPRECATED RDW RBC AUTO: 42.5 FL (ref 35.1–46.3)
DEPRECATED RDW RBC AUTO: 42.7 FL (ref 35.1–46.3)
DEPRECATED RDW RBC AUTO: 43.1 FL (ref 35.1–46.3)
EOSINOPHIL # BLD AUTO: 0.12 X10(3) UL (ref 0–0.7)
EOSINOPHIL # BLD AUTO: 0.19 X10(3) UL (ref 0–0.7)
EOSINOPHIL # BLD AUTO: 0.28 X10(3) UL (ref 0–0.7)
EOSINOPHIL # BLD AUTO: 0.32 X10(3) UL (ref 0–0.7)
EOSINOPHIL NFR BLD AUTO: 0.9 %
EOSINOPHIL NFR BLD AUTO: 1.3 %
EOSINOPHIL NFR BLD AUTO: 2.7 %
EOSINOPHIL NFR BLD AUTO: 3.5 %
ERYTHROCYTE [DISTWIDTH] IN BLOOD BY AUTOMATED COUNT: 13.1 % (ref 11–15)
ERYTHROCYTE [DISTWIDTH] IN BLOOD BY AUTOMATED COUNT: 13.2 % (ref 11–15)
GLOBULIN PLAS-MCNC: 3 G/DL (ref 2.8–4.4)
GLOBULIN PLAS-MCNC: 3.2 G/DL (ref 2.8–4.4)
GLOBULIN PLAS-MCNC: 3.6 G/DL (ref 2.8–4.4)
GLUCOSE BLD-MCNC: 115 MG/DL (ref 70–99)
GLUCOSE BLD-MCNC: 119 MG/DL (ref 70–99)
GLUCOSE BLD-MCNC: 82 MG/DL (ref 70–99)
GLUCOSE BLD-MCNC: 90 MG/DL (ref 70–99)
GLUCOSE UR-MCNC: NEGATIVE MG/DL
HCT VFR BLD AUTO: 35.8 % (ref 35–48)
HCT VFR BLD AUTO: 37.3 % (ref 35–48)
HCT VFR BLD AUTO: 41.9 % (ref 35–48)
HCT VFR BLD AUTO: 43.3 % (ref 35–48)
HGB BLD-MCNC: 11.7 G/DL (ref 12–16)
HGB BLD-MCNC: 12.2 G/DL (ref 12–16)
HGB BLD-MCNC: 13.6 G/DL (ref 12–16)
HGB BLD-MCNC: 14.1 G/DL (ref 12–16)
HGB UR QL STRIP.AUTO: NEGATIVE
IMM GRANULOCYTES # BLD AUTO: 0.05 X10(3) UL (ref 0–1)
IMM GRANULOCYTES # BLD AUTO: 0.06 X10(3) UL (ref 0–1)
IMM GRANULOCYTES # BLD AUTO: 0.07 X10(3) UL (ref 0–1)
IMM GRANULOCYTES # BLD AUTO: 0.08 X10(3) UL (ref 0–1)
IMM GRANULOCYTES NFR BLD: 0.5 %
IMM GRANULOCYTES NFR BLD: 0.5 %
IMM GRANULOCYTES NFR BLD: 0.6 %
IMM GRANULOCYTES NFR BLD: 0.6 %
INR BLD: 1.21 (ref 0.9–1.2)
KETONES UR-MCNC: NEGATIVE MG/DL
LACTATE SERPL-SCNC: 1.1 MMOL/L (ref 0.4–2)
LIPASE SERPL-CCNC: 94 U/L (ref 73–393)
LYMPHOCYTES # BLD AUTO: 1.13 X10(3) UL (ref 1–4)
LYMPHOCYTES # BLD AUTO: 1.18 X10(3) UL (ref 1–4)
LYMPHOCYTES # BLD AUTO: 1.32 X10(3) UL (ref 1–4)
LYMPHOCYTES # BLD AUTO: 1.56 X10(3) UL (ref 1–4)
LYMPHOCYTES NFR BLD AUTO: 10.8 %
LYMPHOCYTES NFR BLD AUTO: 11 %
LYMPHOCYTES NFR BLD AUTO: 12.8 %
LYMPHOCYTES NFR BLD AUTO: 9.8 %
M PROTEIN MFR SERPL ELPH: 5.2 G/DL (ref 6.4–8.2)
M PROTEIN MFR SERPL ELPH: 5.3 G/DL (ref 6.4–8.2)
M PROTEIN MFR SERPL ELPH: 6.2 G/DL (ref 6.4–8.2)
M PROTEIN MFR SERPL ELPH: 6.9 G/DL (ref 6.4–8.2)
MCH RBC QN AUTO: 28 PG (ref 26–34)
MCH RBC QN AUTO: 28.7 PG (ref 26–34)
MCH RBC QN AUTO: 29 PG (ref 26–34)
MCH RBC QN AUTO: 29.2 PG (ref 26–34)
MCHC RBC AUTO-ENTMCNC: 32.5 G/DL (ref 31–37)
MCHC RBC AUTO-ENTMCNC: 32.6 G/DL (ref 31–37)
MCHC RBC AUTO-ENTMCNC: 32.7 G/DL (ref 31–37)
MCHC RBC AUTO-ENTMCNC: 32.7 G/DL (ref 31–37)
MCV RBC AUTO: 86.4 FL (ref 80–100)
MCV RBC AUTO: 87.8 FL (ref 80–100)
MCV RBC AUTO: 88.8 FL (ref 80–100)
MCV RBC AUTO: 89.6 FL (ref 80–100)
MONOCYTES # BLD AUTO: 0.95 X10(3) UL (ref 0.1–1)
MONOCYTES # BLD AUTO: 1.06 X10(3) UL (ref 0.1–1)
MONOCYTES # BLD AUTO: 1.35 X10(3) UL (ref 0.1–1)
MONOCYTES # BLD AUTO: 1.92 X10(3) UL (ref 0.1–1)
MONOCYTES NFR BLD AUTO: 10.3 %
MONOCYTES NFR BLD AUTO: 10.4 %
MONOCYTES NFR BLD AUTO: 14.3 %
MONOCYTES NFR BLD AUTO: 9.4 %
NEUTROPHILS # BLD AUTO: 11.19 X10 (3) UL (ref 1.5–7.7)
NEUTROPHILS # BLD AUTO: 11.19 X10(3) UL (ref 1.5–7.7)
NEUTROPHILS # BLD AUTO: 6.7 X10 (3) UL (ref 1.5–7.7)
NEUTROPHILS # BLD AUTO: 6.7 X10(3) UL (ref 1.5–7.7)
NEUTROPHILS # BLD AUTO: 7.68 X10 (3) UL (ref 1.5–7.7)
NEUTROPHILS # BLD AUTO: 7.68 X10(3) UL (ref 1.5–7.7)
NEUTROPHILS # BLD AUTO: 9.96 X10 (3) UL (ref 1.5–7.7)
NEUTROPHILS # BLD AUTO: 9.96 X10(3) UL (ref 1.5–7.7)
NEUTROPHILS NFR BLD AUTO: 72.5 %
NEUTROPHILS NFR BLD AUTO: 74.3 %
NEUTROPHILS NFR BLD AUTO: 75 %
NEUTROPHILS NFR BLD AUTO: 77.7 %
NITRITE UR QL STRIP.AUTO: NEGATIVE
OSMOLALITY SERPL CALC.SUM OF ELEC: 271 MOSM/KG (ref 275–295)
OSMOLALITY SERPL CALC.SUM OF ELEC: 286 MOSM/KG (ref 275–295)
OSMOLALITY SERPL CALC.SUM OF ELEC: 287 MOSM/KG (ref 275–295)
OSMOLALITY SERPL CALC.SUM OF ELEC: 288 MOSM/KG (ref 275–295)
PH UR: 5 [PH] (ref 5–8)
PLATELET # BLD AUTO: 191 10(3)UL (ref 150–450)
PLATELET # BLD AUTO: 195 10(3)UL (ref 150–450)
PLATELET # BLD AUTO: 229 10(3)UL (ref 150–450)
PLATELET # BLD AUTO: 235 10(3)UL (ref 150–450)
POTASSIUM SERPL-SCNC: 3.7 MMOL/L (ref 3.5–5.1)
POTASSIUM SERPL-SCNC: 3.9 MMOL/L (ref 3.5–5.1)
POTASSIUM SERPL-SCNC: 4.1 MMOL/L (ref 3.5–5.1)
POTASSIUM SERPL-SCNC: 4.1 MMOL/L (ref 3.5–5.1)
PROT UR-MCNC: NEGATIVE MG/DL
PROTHROMBIN TIME: 15.2 SECONDS (ref 11.8–14.5)
RBC # BLD AUTO: 4.03 X10(6)UL (ref 3.8–5.3)
RBC # BLD AUTO: 4.25 X10(6)UL (ref 3.8–5.3)
RBC # BLD AUTO: 4.83 X10(6)UL (ref 3.8–5.3)
RBC # BLD AUTO: 4.85 X10(6)UL (ref 3.8–5.3)
RBC #/AREA URNS AUTO: 1 /HPF
SODIUM SERPL-SCNC: 129 MMOL/L (ref 136–145)
SODIUM SERPL-SCNC: 138 MMOL/L (ref 136–145)
SP GR UR STRIP: 1.02 (ref 1–1.03)
UROBILINOGEN UR STRIP-ACNC: <2
WBC # BLD AUTO: 10.2 X10(3) UL (ref 4–11)
WBC # BLD AUTO: 13.4 X10(3) UL (ref 4–11)
WBC # BLD AUTO: 14.4 X10(3) UL (ref 4–11)
WBC # BLD AUTO: 9.2 X10(3) UL (ref 4–11)
WBC #/AREA URNS AUTO: 5 /HPF

## 2020-01-01 PROCEDURE — 99223 1ST HOSP IP/OBS HIGH 75: CPT | Performed by: NURSE PRACTITIONER

## 2020-01-01 PROCEDURE — 99233 SBSQ HOSP IP/OBS HIGH 50: CPT | Performed by: NURSE PRACTITIONER

## 2020-01-01 PROCEDURE — 71045 X-RAY EXAM CHEST 1 VIEW: CPT | Performed by: FAMILY MEDICINE

## 2020-01-01 PROCEDURE — 99223 1ST HOSP IP/OBS HIGH 75: CPT | Performed by: INTERNAL MEDICINE

## 2020-01-01 PROCEDURE — 99232 SBSQ HOSP IP/OBS MODERATE 35: CPT | Performed by: INTERNAL MEDICINE

## 2020-01-01 PROCEDURE — 74183 MRI ABD W/O CNTR FLWD CNTR: CPT | Performed by: INTERNAL MEDICINE

## 2020-01-01 PROCEDURE — 74176 CT ABD & PELVIS W/O CONTRAST: CPT | Performed by: EMERGENCY MEDICINE

## 2020-01-01 PROCEDURE — 99231 SBSQ HOSP IP/OBS SF/LOW 25: CPT | Performed by: NURSE PRACTITIONER

## 2020-01-01 PROCEDURE — 0FB13ZX EXCISION OF RIGHT LOBE LIVER, PERCUTANEOUS APPROACH, DIAGNOSTIC: ICD-10-PCS | Performed by: RADIOLOGY

## 2020-01-01 RX ORDER — FENTANYL 25 UG/H
1 PATCH TRANSDERMAL
Status: DISCONTINUED | OUTPATIENT
Start: 2020-01-01 | End: 2020-01-01

## 2020-01-01 RX ORDER — FENTANYL 75 UG/H
1 PATCH TRANSDERMAL
Status: DISCONTINUED | OUTPATIENT
Start: 2020-01-01 | End: 2020-01-01

## 2020-01-01 RX ORDER — VITS A,C,E/LUTEIN/MINERALS 300MCG-200
1 TABLET ORAL DAILY
Status: DISCONTINUED | OUTPATIENT
Start: 2020-01-01 | End: 2020-01-01

## 2020-01-01 RX ORDER — VIT A/VIT C/VIT E/ZINC/COPPER 2148-113
1 TABLET ORAL 2 TIMES DAILY
COMMUNITY

## 2020-01-01 RX ORDER — MELATONIN
325
Status: DISCONTINUED | OUTPATIENT
Start: 2020-01-01 | End: 2020-01-01

## 2020-01-01 RX ORDER — DEXAMETHASONE SODIUM PHOSPHATE 4 MG/ML
4 VIAL (ML) INJECTION ONCE
Status: COMPLETED | OUTPATIENT
Start: 2020-01-01 | End: 2020-01-01

## 2020-01-01 RX ORDER — POLYETHYLENE GLYCOL 3350 17 G/17G
17 POWDER, FOR SOLUTION ORAL DAILY PRN
Status: DISCONTINUED | OUTPATIENT
Start: 2020-01-01 | End: 2020-01-01

## 2020-01-01 RX ORDER — GABAPENTIN 100 MG/1
100 CAPSULE ORAL NIGHTLY
Status: DISCONTINUED | OUTPATIENT
Start: 2020-01-01 | End: 2020-01-01

## 2020-01-01 RX ORDER — DOCUSATE SODIUM 100 MG/1
100 CAPSULE, LIQUID FILLED ORAL 2 TIMES DAILY
Status: DISCONTINUED | OUTPATIENT
Start: 2020-01-01 | End: 2020-01-01

## 2020-01-01 RX ORDER — CALCIUM CARBONATE/VITAMIN D3 250-3.125
2 TABLET ORAL DAILY
Status: DISCONTINUED | OUTPATIENT
Start: 2020-01-01 | End: 2020-01-01

## 2020-01-01 RX ORDER — POLYETHYLENE GLYCOL 3350 17 G/17G
17 POWDER, FOR SOLUTION ORAL DAILY
Status: DISCONTINUED | OUTPATIENT
Start: 2020-01-01 | End: 2020-01-01

## 2020-01-01 RX ORDER — DEXAMETHASONE 4 MG/1
4 TABLET ORAL DAILY
Status: DISCONTINUED | OUTPATIENT
Start: 2020-01-01 | End: 2020-01-01

## 2020-01-01 RX ORDER — DEXTROSE, SODIUM CHLORIDE, AND POTASSIUM CHLORIDE 5; .45; .3 G/100ML; G/100ML; G/100ML
INJECTION INTRAVENOUS CONTINUOUS
Status: DISCONTINUED | OUTPATIENT
Start: 2020-01-01 | End: 2020-01-01

## 2020-01-01 RX ORDER — SENNA AND DOCUSATE SODIUM 50; 8.6 MG/1; MG/1
2 TABLET, FILM COATED ORAL DAILY
Status: DISCONTINUED | OUTPATIENT
Start: 2020-01-01 | End: 2020-01-01

## 2020-01-01 RX ORDER — SODIUM PHOSPHATE, DIBASIC AND SODIUM PHOSPHATE, MONOBASIC 7; 19 G/133ML; G/133ML
1 ENEMA RECTAL ONCE AS NEEDED
Status: COMPLETED | OUTPATIENT
Start: 2020-01-01 | End: 2020-01-01

## 2020-01-01 RX ORDER — HYDROMORPHONE HYDROCHLORIDE 4 MG/1
4 TABLET ORAL EVERY 2 HOUR PRN
Qty: 60 TABLET | Refills: 0 | Status: SHIPPED | OUTPATIENT
Start: 2020-01-01

## 2020-01-01 RX ORDER — HYDROMORPHONE HYDROCHLORIDE 2 MG/1
2 TABLET ORAL
Status: DISCONTINUED | OUTPATIENT
Start: 2020-01-01 | End: 2020-01-01

## 2020-01-01 RX ORDER — HEPARIN SODIUM 5000 [USP'U]/ML
5000 INJECTION, SOLUTION INTRAVENOUS; SUBCUTANEOUS EVERY 12 HOURS SCHEDULED
Status: DISCONTINUED | OUTPATIENT
Start: 2020-01-01 | End: 2020-01-01

## 2020-01-01 RX ORDER — DOCUSATE SODIUM 100 MG/1
100 CAPSULE, LIQUID FILLED ORAL DAILY
Status: DISCONTINUED | OUTPATIENT
Start: 2020-01-01 | End: 2020-01-01

## 2020-01-01 RX ORDER — OMEPRAZOLE 20 MG/1
40 CAPSULE, DELAYED RELEASE ORAL EVERY MORNING
COMMUNITY

## 2020-01-01 RX ORDER — GABAPENTIN 100 MG/1
100 CAPSULE ORAL 2 TIMES DAILY
Qty: 60 CAPSULE | Refills: 0 | Status: SHIPPED | OUTPATIENT
Start: 2020-01-01

## 2020-01-01 RX ORDER — FENTANYL 50 UG/H
1 PATCH TRANSDERMAL
Status: DISCONTINUED | OUTPATIENT
Start: 2020-01-01 | End: 2020-01-01

## 2020-01-01 RX ORDER — MONTELUKAST SODIUM 10 MG/1
10 TABLET ORAL NIGHTLY
Status: DISCONTINUED | OUTPATIENT
Start: 2020-01-01 | End: 2020-01-01

## 2020-01-01 RX ORDER — DEXAMETHASONE 4 MG/1
4 TABLET ORAL DAILY
Qty: 30 TABLET | Refills: 0 | Status: SHIPPED | OUTPATIENT
Start: 2020-01-01

## 2020-01-01 RX ORDER — BISACODYL 10 MG
10 SUPPOSITORY, RECTAL RECTAL
Status: DISCONTINUED | OUTPATIENT
Start: 2020-01-01 | End: 2020-01-01

## 2020-01-01 RX ORDER — ACETAMINOPHEN 500 MG
1000 TABLET ORAL EVERY 6 HOURS PRN
Status: DISCONTINUED | OUTPATIENT
Start: 2020-01-01 | End: 2020-01-01

## 2020-01-01 RX ORDER — FENTANYL 12 UG/H
1 PATCH TRANSDERMAL
Status: DISCONTINUED | OUTPATIENT
Start: 2020-01-01 | End: 2020-01-01

## 2020-01-01 RX ORDER — GABAPENTIN 300 MG/1
300 CAPSULE ORAL NIGHTLY
Status: DISCONTINUED | OUTPATIENT
Start: 2020-01-01 | End: 2020-01-01

## 2020-01-01 RX ORDER — FLUTICASONE PROPIONATE 50 MCG
1 SPRAY, SUSPENSION (ML) NASAL
Status: DISCONTINUED | OUTPATIENT
Start: 2020-01-01 | End: 2020-01-01

## 2020-01-01 RX ORDER — GABAPENTIN 300 MG/1
300 CAPSULE ORAL NIGHTLY
Qty: 30 CAPSULE | Refills: 0 | Status: SHIPPED | OUTPATIENT
Start: 2020-01-01

## 2020-01-01 RX ORDER — ENALAPRIL MALEATE AND HYDROCHLOROTHIAZIDE 10; 25 MG/1; MG/1
1 TABLET ORAL DAILY
Status: DISCONTINUED | OUTPATIENT
Start: 2020-01-01 | End: 2020-01-01

## 2020-01-01 RX ORDER — SENNA AND DOCUSATE SODIUM 50; 8.6 MG/1; MG/1
2 TABLET, FILM COATED ORAL
Status: DISCONTINUED | OUTPATIENT
Start: 2020-01-01 | End: 2020-01-01

## 2020-01-01 RX ORDER — HYDROMORPHONE HYDROCHLORIDE 1 MG/ML
0.5 INJECTION, SOLUTION INTRAMUSCULAR; INTRAVENOUS; SUBCUTANEOUS
Status: DISCONTINUED | OUTPATIENT
Start: 2020-01-01 | End: 2020-01-01

## 2020-01-01 RX ORDER — HYDROMORPHONE HYDROCHLORIDE 2 MG/1
2 TABLET ORAL
Qty: 60 TABLET | Refills: 0 | Status: SHIPPED | OUTPATIENT
Start: 2020-01-01 | End: 2020-01-01

## 2020-01-01 RX ORDER — BISACODYL 10 MG
10 SUPPOSITORY, RECTAL RECTAL
Qty: 10 SUPPOSITORY | Refills: 0 | Status: SHIPPED | OUTPATIENT
Start: 2020-01-01

## 2020-01-01 RX ORDER — HYDROMORPHONE HYDROCHLORIDE 4 MG/1
4 TABLET ORAL EVERY 2 HOUR PRN
Status: DISCONTINUED | OUTPATIENT
Start: 2020-01-01 | End: 2020-01-01

## 2020-01-01 RX ORDER — FENTANYL 50 UG/H
1 PATCH TRANSDERMAL
Qty: 10 PATCH | Refills: 0 | Status: SHIPPED | OUTPATIENT
Start: 2020-01-01

## 2020-01-01 RX ORDER — GABAPENTIN 100 MG/1
100 CAPSULE ORAL 2 TIMES DAILY
Status: DISCONTINUED | OUTPATIENT
Start: 2020-01-01 | End: 2020-01-01

## 2020-01-01 RX ORDER — SENNA AND DOCUSATE SODIUM 50; 8.6 MG/1; MG/1
2 TABLET, FILM COATED ORAL
Qty: 60 TABLET | Refills: 0 | Status: SHIPPED | OUTPATIENT
Start: 2020-01-01

## 2020-01-01 RX ORDER — SENNA AND DOCUSATE SODIUM 50; 8.6 MG/1; MG/1
2 TABLET, FILM COATED ORAL DAILY
Qty: 60 TABLET | Refills: 0 | Status: SHIPPED | OUTPATIENT
Start: 2020-01-01

## 2020-01-01 RX ORDER — CETIRIZINE HYDROCHLORIDE 10 MG/1
10 TABLET ORAL DAILY
Status: DISCONTINUED | OUTPATIENT
Start: 2020-01-01 | End: 2020-01-01

## 2020-01-01 RX ORDER — MIDAZOLAM HYDROCHLORIDE 1 MG/ML
INJECTION INTRAMUSCULAR; INTRAVENOUS
Status: COMPLETED
Start: 2020-01-01 | End: 2020-01-01

## 2020-01-01 RX ORDER — FENTANYL 12 UG/H
1 PATCH TRANSDERMAL
Qty: 10 PATCH | Refills: 0 | Status: SHIPPED | OUTPATIENT
Start: 2020-01-01

## 2020-01-01 RX ORDER — PANTOPRAZOLE SODIUM 40 MG/1
40 TABLET, DELAYED RELEASE ORAL
Status: DISCONTINUED | OUTPATIENT
Start: 2020-01-01 | End: 2020-01-01

## 2020-01-01 RX ORDER — HYDROMORPHONE HYDROCHLORIDE 1 MG/ML
INJECTION, SOLUTION INTRAMUSCULAR; INTRAVENOUS; SUBCUTANEOUS
Status: DISPENSED
Start: 2020-01-01 | End: 2020-01-01

## 2020-01-02 NOTE — TELEPHONE ENCOUNTER
Patients daughter called back confirmed she is able to make appt for 1/13 at 4 pm. Waiting for slot to be opened.

## 2020-01-02 NOTE — TELEPHONE ENCOUNTER
Future Appointments   Date Time Provider Karen Schwartz   1/3/2020  9:30 AM [facility] RM[facility]  [address] [place] [address]   1/13/2020  4:00 PM ED Jesus

## 2020-01-02 NOTE — TELEPHONE ENCOUNTER
Nursing: Pt's PCP (Dr. Sabina Do) contacted Dr. Herberth Dodge and requested earlier than 1st available appt for pt (currently scheduled for Jan 21st).  Please see if we can add pt to my schedule on Jan 13 (Monday) at the end of the day in MD approval spot onl

## 2020-01-05 PROBLEM — D49.89 INTRA-ABDOMINAL TUMOR: Status: ACTIVE | Noted: 2020-01-01

## 2020-01-05 NOTE — ED PROVIDER NOTES
Patient Seen in: [facility] Emergency Department      History   Patient presents with:  Abdomen/Flank Pain    Stated Complaint: abd pain- sent by Evan Malone    HPI    80year old female with h/o migraines, htn who presents with 1 month of progressiv is not in acute distress. Appearance: She is well-developed. She is not toxic-appearing or diaphoretic. HENT:      Head: Normocephalic and atraumatic.    Eyes:      Conjunctiva/sclera: Conjunctivae normal.      Pupils: Pupils are equal, round, and albin within normal limits   URINALYSIS WITH CULTURE REFLEX - Abnormal; Notable for the following components:    Leukocyte Esterase Urine Trace (*)     Bacteria Urine Few (*)     All other components within normal limits   CARBOHYDRATE ANTIGEN 19-9 - Abnormal; N pancreas and spleen, obliterating the normal fat plane between these 2 structures and it measures approximately 53 x 67 x 47 mm. It is possible 1 of these foci represents a primary neoplastic process or both foci could represent metastasis.   Infection cou range)   ferrous sulfate EC tab 325 mg (has no administration in time range)   Fluticasone Propionate (FLONASE) 50 MCG/ACT nasal spray 1 spray (has no administration in time range)   cetirizine (ZYRTEC) tab 10 mg (has no administration in time range)   Mon diagnosis)    Disposition:  Admit  1/5/2020  5:27 pm    Follow-up:  No follow-up provider specified.   We recommend that you schedule follow up care with a primary care provider within the next three months to obtain basic health screening including reasses

## 2020-01-05 NOTE — ED INITIAL ASSESSMENT (HPI)
Pt reports upper abd pain that radiates into back and into left arm. Denies CP and SOB. Pain with inspiration. Denies n/v/d. Denies urinary s/s.

## 2020-01-06 NOTE — PROGRESS NOTES
[facility] Dosing Service  Antibiotic Dosing    Will Bangura is a 80year old female for whom pharmacy is dosing zosyn for treatment of  intra-abdominal infection.  .  Other antibiotics (Not dosed by pharmacy): none    Allergies: is allergic to bl

## 2020-01-06 NOTE — PROGRESS NOTES
[facility]    CDI Prediction Tool Protocol (Vancomycin Initiated)    OVP (oral vancomycin prophylaxis) 125 mg PO Daily is being started in this patient based on a score of 13.       Score Breakdown:  High risk antibiotic use (5 points)  Malignanc

## 2020-01-06 NOTE — PLAN OF CARE
Pt alert and oriented x4. VSS. On room air. Tolerating diet, no nausea. Voiding in toilet. Heparin for DVT prophylaxis. Tylenol for pain control. IV fluids running. Plan to have MRI of abdomen later today.  Up with standby assist in room, walker when ambula Manage/alleviate anxiety  - Utilize distraction and/or relaxation techniques  - Monitor for opioid side effects  - Notify MD/LIP if interventions unsuccessful or patient reports new pain  - Anticipate increased pain with activity and pre-medicate as approp

## 2020-01-06 NOTE — H&P
[facility]    PATIENT'S NAME: Ryder Garcia   ATTENDING PHYSICIAN: Bijan Almaguer MD   PATIENT ACCOUNT#:   [id number]    LOCATION:  [address] [place] [address] RECORD #:   [id number]       YOB: 1929  ADMISSION DATE: Positive bowel sounds. Soft. Slight epigastric tenderness. No rebound. No guarding. EXTREMITIES:  No cyanosis, clubbing, or edema. NEUROLOGIC:  Alert, oriented x3. Cranial nerves II-XII are intact. She does move all extremities.     ASSESSMENT AND P

## 2020-01-06 NOTE — CONSULTS
Gastroenterology consultation note    Reason for consultation:  Abdominal pain and abnormal CT imaging of the pancreatic tail and right lobe of the liver      History of present illness:   The patient is a pleasant, alert 80year old female who is seen toda [Radiology *    HIVES  Sesame Oil              ANAPHYLAXIS  Sesame Seeds            ANAPHYLAXIS    Comment:All things Sesame      vancomycin HCl (FIRVANQ) 50 MG/ML oral solution 125 mg, 125 mg, Oral, Daily  dextrose 5 % and 0.45 % NaCl with KCl 40 mEq inf as needed.  ), Disp: 1 Bottle, Rfl: 11  Enalapril-Hydrochlorothiazide 10-25 MG Oral Tab, , Disp: , Rfl: 1  loratadine (CLARITIN) 10 MG Oral Tab, Take 1 tablet by mouth daily.  (Patient taking differently: Take 10 mg by mouth daily as needed.  ), Disp: 30 ta Asked        Special Diet: Not Asked        Back Care: Not Asked        Exercise: Not Asked        Bike Helmet: Not Asked        Seat Belt: Not Asked        Self-Exams: Not Asked    Social History Narrative      Not on file        Family History   Problem suspicious for neoplastic process. The 1st focus is located in the dome of the right hepatic lobe and measures approximately 59 x 47 x  41 mm.   A 2nd focus is located between the pancreas and spleen, obliterating the normal fat plane between these 2 struc discussed other possibilities including lymphoma or a neuroendocrine tumor. Diagnostic options could include percutaneous biopsy of the liver or pancreatic lesion versus endoscopic ultrasound and FNA of the pancreatic lesion.   The latter procedure may be

## 2020-01-06 NOTE — PLAN OF CARE
Patient's pain being managed with PRN dilaudid. IVF running and IV abx given. Up with standby assist, voiding freely. Patient with decreased appetite but tolerating diet. Plan on going.     Problem: Patient Centered Care  Goal: Patient preferences are ident unsuccessful or patient reports new pain  - Anticipate increased pain with activity and pre-medicate as appropriate  Outcome: Progressing     Problem: SAFETY ADULT - FALL  Goal: Free from fall injury  Description  INTERVENTIONS:  - Assess pt frequently for

## 2020-01-07 NOTE — PROGRESS NOTES
[facility]    Progress Note    [address] Patient Status:  Inpatient    1929 MRN [id number]   Location [facility] 4W/SW/SE Attending Ellie Schwab MD   Hosp Day # 2 PCP Rosa Maria Vicente MD       Subjective:   Sumit Gutierrez 01/06/2020    ALT 39 01/06/2020    INR 1.01 03/01/2019    LIP 94 01/05/2020       No procedure found. Ct Abdomen+pelvis(cpt=74176)    Result Date: 1/5/2020  CONCLUSION:  1.   Limited exam due to lack of IV contrast.  This decreases sensitivity for solid

## 2020-01-07 NOTE — PLAN OF CARE
Pt alert and oriented x4. VSS. On room air. Tolerating diet, no nausea. Cdiff sample negative. Voiding in toilet. Heparin for DVT prophylaxis. Tylenol and 1x dose of dilaudid for pain control. IV fluids running. Plan to have liver biopsy tomorrow.  Up with pain and pain management  - Manage/alleviate anxiety  - Utilize distraction and/or relaxation techniques  - Monitor for opioid side effects  - Notify MD/LIP if interventions unsuccessful or patient reports new pain  - Anticipate increased pain with activit vomiting  Description  INTERVENTIONS:  - Maintain adequate hydration with IV or PO as ordered and tolerated  - Nasogastric tube to low intermittent suction as ordered  - Evaluate effectiveness of ordered antiemetic medications  - Provide nonpharmacologic c Progressing     Problem: SKIN/TISSUE INTEGRITY - ADULT  Goal: Skin integrity remains intact  Description  INTERVENTIONS  - Assess and document risk factors for pressure ulcer development  - Assess and document skin integrity  - Monitor for areas of redness

## 2020-01-07 NOTE — PLAN OF CARE
Problem: Patient Centered Care  Goal: Patient preferences are identified and integrated in the patient's plan of care  Description  Interventions:  - What would you like us to know as we care for you?   - Provide timely, complete, and accurate informatio fall injury  Description  INTERVENTIONS:  - Assess pt frequently for physical needs  - Identify cognitive and physical deficits and behaviors that affect risk of falls.   - [place] fall precautions as indicated by assessment.  - Educate pt/family on patie balance  Outcome: Progressing  Goal: Maintains or returns to baseline bowel function  Description  INTERVENTIONS:  - Assess bowel function  - Maintain adequate hydration with IV or PO as ordered and tolerated  - Evaluate effectiveness of GI medications  - mucous membranes remain intact  Description  INTERVENTIONS  - Assess oral mucosa and hygiene practices  - Implement preventative oral hygiene regimen  - Implement oral medicated treatments as ordered  Outcome: Progressing

## 2020-01-08 NOTE — IVS NOTE
Procedure hand off report given to [email or web address]. Pt's vital signs are stable. Procedural access site is dry and intact with no signs and symptoms of bleeding and hematoma. Dr. Maricruz Vallejo spoke with patient/family pre procedure.

## 2020-01-08 NOTE — PHYSICAL THERAPY NOTE
Pt was to be seen for PT eval. Attempted to see pt twice. On first attempt, Pt was off the floor for biopsy. Returned later in PM. Pt is on bedrest until 6:14 p.m. Will re-attempt tomorrow if appropriate to see pt.

## 2020-01-08 NOTE — PLAN OF CARE
Problem: Patient Centered Care  Goal: Patient preferences are identified and integrated in the patient's plan of care  Description  Interventions:  - What would you like us to know as we care for you?   - Provide timely, complete, and accurate informatio fall injury  Description  INTERVENTIONS:  - Assess pt frequently for physical needs  - Identify cognitive and physical deficits and behaviors that affect risk of falls.   - [place] fall precautions as indicated by assessment.  - Educate pt/family on patie balance  Outcome: Progressing  Goal: Maintains or returns to baseline bowel function  Description  INTERVENTIONS:  - Assess bowel function  - Maintain adequate hydration with IV or PO as ordered and tolerated  - Evaluate effectiveness of GI medications  - mucous membranes remain intact  Description  INTERVENTIONS  - Assess oral mucosa and hygiene practices  - Implement preventative oral hygiene regimen  - Implement oral medicated treatments as ordered  Outcome: Progressing     Problem: RESPIRATORY - ADULT

## 2020-01-08 NOTE — PROGRESS NOTES
[facility]    Progress Note    [address] Patient Status:  Inpatient    1929 MRN [id number]   Location [facility] 4W/SW/SE Attending Gama Garcia MD   Hosp Day # 3 PCP Naseem Mario MD       Subjective:   Lorin Gerber (H) 01/08/2020    BILT 0.3 01/08/2020    TP 5.3 (L) 01/08/2020    AST 55 (H) 01/08/2020    ALT 44 01/08/2020    INR 1.21 (H) 01/08/2020    LIP 94 01/05/2020       No procedure found.     Mri Abdomen (w+wo) ([id number])    Result Date: 1/7/2020  CONCLUSION:

## 2020-01-08 NOTE — PRE-SEDATION ASSESSMENT
[place] [facility]  IR Pre-Procedure Sedation Assessment    History of snoring or sleep or apnea?    No    History of previous problems with anesthesia or sedation  No    Physical Findings:  Neck: nl ROM  CV: RRR  PULM: normal respiratory rate/effor

## 2020-01-08 NOTE — PROCEDURES
[facility] HOSP - [facility]  Procedure Note    [address] Patient Status:  Inpatient    1929 MRN [id number]   Location [facility] Attending Chuckie Pinedo MD   Hosp Day # 3 PCP Mela Roblero MD     Procedur

## 2020-01-08 NOTE — PROGRESS NOTES
[facility]     Gastroenterology Progress Note    Shira Plasencia Patient Status:  Inpatient    1929 MRN [id number]   Location [facility] 4W/SW/SE Attending Chuckie Pinedo MD   Hosp Day # 2 PCP MC Sanchez palpable mass or fullness in the abdomen. Liver and spleen are not palpable. Extremities: Without edema  Neurologic: Awake, alert and appropriate. Nonfocal examination. Affect:Normal   Skin: No jaundice. Warm and dry.       Results:     Recent Labs   L This decreases sensitivity for solid organ assessment. There are 2 foci suspicious for neoplastic process. The 1st focus is located in the dome of the right hepatic lobe and measures approximately 59 x 47 x  41 mm.   A 2nd focus is located between the pan

## 2020-01-08 NOTE — PLAN OF CARE
Pt alert and oriented x4. VSS. On room air. NPO for biopsy, no nausea. Voiding in toilet. AM heparin held for biopsy. Fentanyl patch and dilaudid for pain control. IV fluids running. Liver biopsy today.  Up with standby assist in room, walker when ambulatin anxiety  - Utilize distraction and/or relaxation techniques  - Monitor for opioid side effects  - Notify MD/LIP if interventions unsuccessful or patient reports new pain  - Anticipate increased pain with activity and pre-medicate as appropriate  Outcome: P PO as ordered and tolerated  - Nasogastric tube to low intermittent suction as ordered  - Evaluate effectiveness of ordered antiemetic medications  - Provide nonpharmacologic comfort measures as appropriate  - Advance diet as tolerated, if ordered  - Obtai intact  Description  INTERVENTIONS  - Assess and document risk factors for pressure ulcer development  - Assess and document skin integrity  - Monitor for areas of redness and/or skin breakdown  - Initiate interventions, skin care algorithm/standards of ca

## 2020-01-09 PROBLEM — C78.7 PANCREATIC CANCER METASTASIZED TO LIVER (HCC): Status: ACTIVE | Noted: 2020-01-01

## 2020-01-09 PROBLEM — C25.9 PANCREATIC CANCER METASTASIZED TO LIVER (HCC): Status: ACTIVE | Noted: 2020-01-01

## 2020-01-09 NOTE — PROGRESS NOTES
[facility]     Gastroenterology Progress Note    Yudy Plasencia Patient Status:  Inpatient    1929 MRN [id number]   Location [facility] 4W/SW/SE Attending Nathen Earl MD   Hosp Day # 4 PCP MC Leos and dry. Ext: No cyanosis, clubbing or edema is evident.    Neuro- Alert and interactive, and gross movements of extremities normal  Affect-Normal      Results:     Recent Labs   Lab 01/05/20  1402 01/06/20  0525 01/08/20  0515   RBC 4.83 4.03 4.25   HGB 1 Portable  (cpt=[id number])    Result Date: 1/9/2020  CONCLUSION:  1. Cardiomegaly. 2. Atherosclerosis. 3. Consolidation/atelectasis at the lung bases. 4. Blunted left costophrenic angle. 5. Prominent markings. Early CHF? 6. Demineralization. 7. Scoliosis.  8. O

## 2020-01-09 NOTE — PROGRESS NOTES
[facility]     Gastroenterology Progress Note    Laureen Plasencia Patient Status:  Inpatient    1929 MRN [id number]   Location [facility] 4W/SW/SE Attending Alton Patricia MD   Hosp Day # 3 PCP MC Macario sounds are present. There is mild direct tenderness to palpation in the upper abdomen without definitive palpable mass. Skin- No jaundice. Warm and dry. Ext: No cyanosis, clubbing or edema is evident.    Neuro- Alert and interactive, and gross movements Dictated by (CST): Rene Moreno MD on 1/07/2020 at 12:34     Approved by (CST): Rene Moreno MD on 1/07/2020 at 13:05          Ir Biopsy    Result Date: 1/8/2020  CONCLUSION:  1.  Successful ultrasound-guided percutaneous core biopsy of right lobe milo

## 2020-01-09 NOTE — PLAN OF CARE
IV fluids infusing, pt complaining of RLQ pain since liver biopsy yesterday, fentanyl patch applied to right hip, prn dilaudid for pain, up with walker and assist, daughter at bedside     Problem: Patient Centered Care  Goal: Patient preferences are identi unsuccessful or patient reports new pain  - Anticipate increased pain with activity and pre-medicate as appropriate  Outcome: Progressing     Problem: SAFETY ADULT - FALL  Goal: Free from fall injury  Description  INTERVENTIONS:  - Assess pt frequently for antiemetic medications  - Provide nonpharmacologic comfort measures as appropriate  - Advance diet as tolerated, if ordered  - Obtain nutritional consult as needed  - Evaluate fluid balance  Outcome: Progressing  Goal: Maintains or returns to baseline anuj skin integrity  - Monitor for areas of redness and/or skin breakdown  - Initiate interventions, skin care algorithm/standards of care as needed  Outcome: Progressing  Goal: Oral mucous membranes remain intact  Description  INTERVENTIONS  - Assess oral muco

## 2020-01-09 NOTE — CONSULTS
[facility]    Report of Hematology/Oncology Consultation    [facility]:  [facility]    1929 MRN [id number]   Location 2/[facility] Attending Julia Candelario MD   Date of admission 2020  1:48 PM   PCP Melquiades Link The patient's daughter states that the patient usually is fully independent and takes care of her own ADLs. However, for the past couple months the patient has slowed down.   She states that during [place] trudy the patient is usually very happy and excite A CA-19-9 was performed upon admission which was 985.3.    She then underwent an MRI of the abdomen, which showed a necrotic, infiltrative mass arising from the tail of the pancreas with possible direct invasion into the spleen with direct contiguous exten Multiple Vitamins-Minerals (PRESERVISION AREDS) Oral Tab, Take by mouth., Disp: , Rfl:   omeprazole 20 MG Oral Capsule Delayed Release, Take 40 mg by mouth every morning., Disp: , Rfl:   Calcium Carbonate-Vitamin D (CALCIUM 500 + D) 500-125 MG-UNIT Oral Ta • enalapril-hydrochlorothiazide (VASORETIC 10/25) combination tablet ([facility] only)   Oral Daily         Family History   Problem Relation Age of Onset   • Cancer Mother         esophageal   • Breast Cancer Daughter    • Melanoma Other        Social History Result Date: 1/7/2020  CONCLUSION:  1. There is a necrotic, infiltrative mass arising from the pancreatic tail, compatible with malignancy.  This appears to invade the spleen the and direct, contiguous extension, and also appears to involve the superior judith Discussed with the patient and the patient's daughter that the patient very likely has had this cancer for several years given her symptoms starting approximately 2 years ago.     Discussed the natural history and poor prognosis associated with this diagnos

## 2020-01-09 NOTE — PROGRESS NOTES
[facility]    Progress Note    [address] Patient Status:  Inpatient    1929 MRN [id number]   Location [facility] 4W/SW/SE Attending Chuckie Pinedo MD   Hosp Day # 4 PCP Mela Roblero MD       Subjective:   Francie Queen 01/08/2020    CA 8.4 (L) 01/08/2020    ALB 2.1 (L) 01/08/2020    ALKPHO 161 (H) 01/08/2020    BILT 0.3 01/08/2020    TP 5.3 (L) 01/08/2020    AST 55 (H) 01/08/2020    ALT 44 01/08/2020    INR 1.21 (H) 01/08/2020    LIP 94 01/05/2020       No procedure foun

## 2020-01-09 NOTE — PLAN OF CARE
Problem: Patient Centered Care  Goal: Patient preferences are identified and integrated in the patient's plan of care  Description  Interventions:  - What would you like us to know as we care for you?   - Provide timely, complete, and accurate informatio fall injury  Description  INTERVENTIONS:  - Assess pt frequently for physical needs  - Identify cognitive and physical deficits and behaviors that affect risk of falls.   - [place] fall precautions as indicated by assessment.  - Educate pt/family on patie balance  Outcome: Progressing  Goal: Maintains or returns to baseline bowel function  Description  INTERVENTIONS:  - Assess bowel function  - Maintain adequate hydration with IV or PO as ordered and tolerated  - Evaluate effectiveness of GI medications  - mucous membranes remain intact  Description  INTERVENTIONS  - Assess oral mucosa and hygiene practices  - Implement preventative oral hygiene regimen  - Implement oral medicated treatments as ordered  Outcome: Progressing     Problem: RESPIRATORY - ADULT

## 2020-01-09 NOTE — PHYSICAL THERAPY NOTE
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 GOALS  Patient was able to achieve the following goals . ..     Patient was able to transfer w/ supervision   Patient able to ambulate on level surfaces w/ supervision

## 2020-01-09 NOTE — CM/SW NOTE
1/13 1059am: Plan is for home with hospice. Referral has been canceled to [facility]. Plan is to discharge home with [facility]. ----------------------  1/10 1212pm: MD order received regarding informational hospice meeting.   Referral ha interested in a hospital bed. Referral made to [facility] with HME for a hospital bed. SW/CM will continue to follow and assist with discharge planning needs. Specific HHC orders will be needed prior to discharge.     HME order is on the chart for MD dow

## 2020-01-10 PROBLEM — G89.3 CANCER RELATED PAIN: Status: ACTIVE | Noted: 2020-01-01

## 2020-01-10 PROBLEM — K59.03 THERAPEUTIC OPIOID INDUCED CONSTIPATION: Status: ACTIVE | Noted: 2020-01-01

## 2020-01-10 PROBLEM — T40.2X5A THERAPEUTIC OPIOID INDUCED CONSTIPATION: Status: ACTIVE | Noted: 2020-01-01

## 2020-01-10 PROBLEM — Z71.89 GOALS OF CARE, COUNSELING/DISCUSSION: Status: ACTIVE | Noted: 2020-01-01

## 2020-01-10 PROBLEM — Z71.89 ADVANCED CARE PLANNING/COUNSELING DISCUSSION: Status: ACTIVE | Noted: 2020-01-01

## 2020-01-10 NOTE — HOSPICE RN NOTE
Met with patient and her daughter. Discussed hospice vs. Palliative care. Patient goal is to discharge to her home- she needs to decide on palliative or hospice services.  Currently receiving a fentanyl 25mcg patch and IV dilaudid- per patient she is going

## 2020-01-10 NOTE — RESTORATIVE THERAPY
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

## 2020-01-10 NOTE — PLAN OF CARE
No acute events overnight. Pain managed with PRN dilaudid and fentanyl patch. Upx1 with walker. Voiding freely. Plan on going.     Problem: Patient Centered Care  Goal: Patient preferences are identified and integrated in the patient's plan of care  Tenisha No increased pain with activity and pre-medicate as appropriate  Outcome: Progressing     Problem: SAFETY ADULT - FALL  Goal: Free from fall injury  Description  INTERVENTIONS:  - Assess pt frequently for physical needs  - Identify cognitive and physical defi measures as appropriate  - Advance diet as tolerated, if ordered  - Obtain nutritional consult as needed  - Evaluate fluid balance  Outcome: Progressing  Goal: Maintains or returns to baseline bowel function  Description  INTERVENTIONS:  - Assess bowel fun breakdown  - Initiate interventions, skin care algorithm/standards of care as needed  Outcome: Progressing  Goal: Oral mucous membranes remain intact  Description  INTERVENTIONS  - Assess oral mucosa and hygiene practices  - Implement preventative oral hyg

## 2020-01-10 NOTE — CONSULTS
[address] Patient Status:  Inpatient    1929 MRN [id number]   Location [facility] 4W/SW/SE Attending José Manuel Alba MD   Hosp Day # 5 PCP Jennifer Soto MD     Date Small left basal pleural effusion. 4.  Large hiatal hernia with the majority the stomach located above the diaphragm. No gastric obstruction. 5.  Post hysterectomy. No adnexal mass. Ovaries are not identified and may have been removed or are atrophic 6. hospitalization this year (she was hospitalized at [facility] in March 2019 after the right calcaneal tuberosity avulsion fracture and ORIF). She otherwise says that she hasn't been hospitalized since 1977 or 1978.     Patient seen and examined in bed the abdomen is worse with movement, breathing, sneezing and coughing. It is worse sometimes when eating. Pain is described as a terrible pressure. She says that she has difficulty quantifying the pain with a numerical scale.  She was not taking any pain med which is described as soft. Education was provided about the importance of regular bowel medication management in the setting of taking opioid medications.  She does not like the Miralax and so I will make this medication PRN in lieu of scheduled and add so are agreeable to meeting with [facility] for a home hospice informational session. Seema Perez is unsure whether she wants to return to her own home in [place] or move in with her daughter Antonella Fregoso who lives near [place].  She says her friend Medications:   •  PEG 3350 (MIRALAX) powder packet 17 g, 17 g, Oral, Daily PRN  •  bisacodyl (DULCOLAX) rectal suppository 10 mg, 10 mg, Rectal, Daily PRN  •  Senna-Docusate Sodium (SENOKOT S) 8.6-50 MG tab 2 tablet, 2 tablet, Oral, Daily PRN  •  HYDROmorp 27.0 01/08/2020     (H) 01/08/2020    CA 8.4 (L) 01/08/2020    ALB 2.1 (L) 01/08/2020    ALKPHO 161 (H) 01/08/2020    BILT 0.3 01/08/2020    TP 5.3 (L) 01/08/2020    AST 55 (H) 01/08/2020    ALT 44 01/08/2020       Imaging:  Xr Chest Ap Portable  (c making preferences: Servando Laird is making her own health care decisions at this time  Code status: DNR/DNI  Have advanced directives been discussed with patient or healthcare power of : Yes        Healthcare Agent Appointed: Yes  Healthcare Agent's Name: which included all of the following:direct face to face contact, history taking, physical examination, and >50% was spent counseling and coordinating care. Discussed today's visit with Dr. Ramu Lynn, Dr. Orlando Arambula, and STEVEN Sunshine.      Thank yo

## 2020-01-10 NOTE — PROGRESS NOTES
[facility]    Progress Note    [address] Patient Status:  Inpatient    1929 MRN [id number]   Location [facility] 4W/SW/SE Attending Shahab Weeks MD   Hosp Day # 5 PCP Juan Menjivar MD       Subjective:   Chetna Caballero his/her head 30 degrees or more in order to avoid pain. Patient is bed bound and is not able to reposition him/her self and needs the bed to alleviate pain in his/her abdomen.   It is in my opinion that a semi-electric hospital bed is reasonable and necess

## 2020-01-10 NOTE — PLAN OF CARE
Pt alert and oriented x4. VSS. On room air. Tolerating diet, no nausea. Voiding in toilet. Fentanyl patch and dilaudid PO for pain control. IV fluids discontinued. Palliative care and hospice meetings had today. Status switched to DNR, POLST form in chart. influences on pain and pain management  - Manage/alleviate anxiety  - Utilize distraction and/or relaxation techniques  - Monitor for opioid side effects  - Notify MD/LIP if interventions unsuccessful or patient reports new pain  - Anticipate increased nate vomiting  Description  INTERVENTIONS:  - Maintain adequate hydration with IV or PO as ordered and tolerated  - Nasogastric tube to low intermittent suction as ordered  - Evaluate effectiveness of ordered antiemetic medications  - Provide nonpharmacologic c Progressing     Problem: SKIN/TISSUE INTEGRITY - ADULT  Goal: Skin integrity remains intact  Description  INTERVENTIONS  - Assess and document risk factors for pressure ulcer development  - Assess and document skin integrity  - Monitor for areas of redness

## 2020-01-10 NOTE — PROGRESS NOTES
01/10/20 1644   Clinical Encounter Type   Visited With Patient and family together  (Daughter)   Routine Visit Introduction   Continue Visiting Yes   Referral From Nurse   Referral To    Denominational Encounters   Spiritual Requests During Visit / Daniel Hidalgo

## 2020-01-11 NOTE — PROGRESS NOTES
[facility]    Progress Note    [address] Patient Status:  Inpatient    1929 MRN [id number]   Location [facility] 4W/SW/SE Attending Nathen Earl MD   Hosp Day # 6 PCP Caro Christensen MD       Subjective:   Doug Miller K 4.1 01/08/2020     01/08/2020    CO2 27.0 01/08/2020     (H) 01/08/2020    CA 8.4 (L) 01/08/2020    ALB 2.1 (L) 01/08/2020    ALKPHO 161 (H) 01/08/2020    BILT 0.3 01/08/2020    TP 5.3 (L) 01/08/2020    AST 55 (H) 01/08/2020    ALT 44 01/

## 2020-01-11 NOTE — PROGRESS NOTES
[place] [facility] - [facility]    Progress Note    [address] Patient Status:  Inpatient    1929 MRN [id number]   Location [facility] 4W/SW/SE Attending Rebekah Noble MD   Hosp Day # 5 PCP Star Parisi MD        Subjective:   Marry Marks Pain control:  Continue fentanyl patch 25 mcg/hr, titrate pending 24 hr use of breakthrough dilaudid. Dilaudid 2mg po q 3 hrs prn.   Dexamethasone 4mg po qam.  Add gabapentin for neuropathic component of pain given local extent and likely nerve involvement

## 2020-01-11 NOTE — PLAN OF CARE
Plan of care reviewed with patient and her family at bedside. Patient continues to have severe pain. PO dilaudid and IV dilaudid given for pain control. Fentanyl patch dosage increased. Up with 1 assist and walker.   Safety measures in place and call light techniques  - Monitor for opioid side effects  - Notify MD/LIP if interventions unsuccessful or patient reports new pain  - Anticipate increased pain with activity and pre-medicate as appropriate  Outcome: Progressing     Problem: SAFETY ADULT - FALL  Goal to low intermittent suction as ordered  - Evaluate effectiveness of ordered antiemetic medications  - Provide nonpharmacologic comfort measures as appropriate  - Advance diet as tolerated, if ordered  - Obtain nutritional consult as needed  - Evaluate flui document risk factors for pressure ulcer development  - Assess and document skin integrity  - Monitor for areas of redness and/or skin breakdown  - Initiate interventions, skin care algorithm/standards of care as needed  Outcome: Progressing  Goal: Oral mu

## 2020-01-11 NOTE — HOSPICE RN NOTE
Met with pt and family at bedside to follow up with their decision. Pt and family stated that they would like to pursue hospice at home. Explained that consents needed to be signed and that residential orders dme for need.  Family requesting hospice call Ti

## 2020-01-11 NOTE — PLAN OF CARE
Gabapentin added overnight. PO dilaudid given for pain. Patient slept well. Up with 1 assist and walker, voiding WNL. Weak cough noted. Safety plan in place, bed alarm engaged. Patient hoping to go home when pain is controlled with oral medications.      Pr injury  Description  INTERVENTIONS:  - Assess pt frequently for physical needs  - Identify cognitive and physical deficits and behaviors that affect risk of falls.   - [place] fall precautions as indicated by assessment.  - Educate pt/family on patient sa

## 2020-01-12 NOTE — PLAN OF CARE
Pain control with PO dilaudid. Up with 1 assist to bathroom, voiding WNL. Admits to poor appetite. Safety plan in place. Plan is for hospice papers to be signed Sunday, then home with family possibly Monday.      Problem: Patient Centered Care  Goal: Lynn injury  Description  INTERVENTIONS:  - Assess pt frequently for physical needs  - Identify cognitive and physical deficits and behaviors that affect risk of falls.   - [place] fall precautions as indicated by assessment.  - Educate pt/family on patient sa

## 2020-01-12 NOTE — PROGRESS NOTES
[place] [facility]    Progress Note    [address] [place] Patient Status:  Inpatient    1929 MRN [id number]   Location [facility] 4W/SW/SE Attending Gama Garcia MD   Hosp Day # 7 PCP Naseem Mario MD       Subjective:   Lorin Gerber  01/08/2020    CO2 27.0 01/08/2020     (H) 01/08/2020    CA 8.4 (L) 01/08/2020    ALB 2.1 (L) 01/08/2020    ALKPHO 161 (H) 01/08/2020    BILT 0.3 01/08/2020    TP 5.3 (L) 01/08/2020    AST 55 (H) 01/08/2020    ALT 44 01/08/2020    INR 1.21

## 2020-01-12 NOTE — PLAN OF CARE
Plan of care reviewed with patient. Pain controlled with PO dilaudid. Up with 1 assist to bathroom, voiding WNL. Eating well today. Patient's daughter met with hospice and discharge planning carried out.  Safety measures in place and call light within reach analgesics based on type and severity of pain and evaluate response  - Implement non-pharmacological measures as appropriate and evaluate response  - Consider cultural and social influences on pain and pain management  - Manage/alleviate anxiety  - Utilize their own health  - Refer to Case Management Department for coordinating discharge planning if the patient needs post-hospital services based on physician/LIP order or complex needs related to functional status, cognitive ability or social support system fluid and nutrition restrictions as appropriate  1/12/2020 1616 by Joleen Mnacia RN  Outcome: Progressing  1/12/2020 1615 by Joleen Mancia, RN  Outcome: Progressing  Goal: Hemodynamic stability and optimal renal function maintained  Description  INTERVE based on oxygen saturation or ABGs  - Provide Smoking Cessation handout, if applicable  - Encourage broncho-pulmonary hygiene including cough, deep breathe, Incentive Spirometry  - Assess the need for suctioning and perform as needed  - Assess and instruct

## 2020-01-13 NOTE — CM/SW NOTE
MSW met with pt and dtr who is at bedside. Pt will not be discharging to home today due to pain concerns. TNL addressing pain, MSW assisted with questions re: DME delivery scheduled for today. Offered emotional support.  MSW will continue to support pt an

## 2020-01-13 NOTE — PROGRESS NOTES
[facility]    Progress Note    [address] [place] Patient Status:  Inpatient    1929 MRN [id number]   Location [facility] 4W/SW/SE Attending Nathen Earl MD   Hosp Day # 8 PCP Caro Christensen MD       Subjective:   Doug Miller 01/08/2020    K 4.1 01/08/2020     01/08/2020    CO2 27.0 01/08/2020     (H) 01/08/2020    CA 8.4 (L) 01/08/2020    ALB 2.1 (L) 01/08/2020    ALKPHO 161 (H) 01/08/2020    BILT 0.3 01/08/2020    TP 5.3 (L) 01/08/2020    AST 55 (H) 01/08/2020

## 2020-01-13 NOTE — PROGRESS NOTES
[place] [facility] - [facility]    Progress Note    [address] [place] Patient Status:  Inpatient    1929 MRN [id number]   Location [facility] 4W/SW/SE Attending Alton Patricia MD   Hosp Day # 8 PCP Elizabeth Mireles MD        Subjective:   Richard Winslow Pain control:  Continue fentanyl patch 62 mcg/hr, titrate pending 24 hr use of breakthrough dilaudid. Dilaudid 4 mg po q 3 hrs prn.   Dexamethasone 4mg po qam.  Add gabapentin for neuropathic component of pain given local extent and likely nerve involvemen

## 2020-01-13 NOTE — PALLIATIVE CARE NOTE
[place] [facility] - [facility]  Palliative Care Follow Up    [address] [place] Patient Status:  Inpatient    1929 MRN [id number]   Location [facility] 4W/SW/SE Attending Camille Holder MD   Hosp Day # 8 PCP Joann Villavicencio MD     Date of Co about pain management at home and all of the recent pain medication changes here. I let daughter know that the IVP Dilaudid is the equivalent of 2.5 mg of PO Dilaudid.  Patient required five doses of breakthrough medication over the past 24 hours (Dilaudid resp. rate 18, height 4' 10\" (1.473 m), weight 134 lb 4.8 oz (60.9 kg), SpO2 96 %. Body mass index is 28.07 kg/m².   Present Level of pain: pt did not quantify severity    Intake and Output:    Intake/Output Summary (Last 24 hours) at 1/13/2020 1100  Last (DILAUDID) 1 MG/ML injection 0.5 mg, 0.5 mg, Intravenous, Q3H PRN  •  acetaminophen (TYLENOL EXTRA STRENGTH) tab 1,000 mg, 1,000 mg, Oral, Q6H PRN  •  Fluticasone Propionate (FLONASE) 50 MCG/ACT nasal spray 1 spray, 1 spray, Each Nare, Daily PRN  •  Gurpreet her daughter  Code status: DNR/DNI  Have advanced directives been discussed with patient or healthcare power of : Yes        Healthcare Agent Appointed: Yes  Healthcare Agent's Name: Timbo Florian (daughter)  Healthcare Agent's Phone Number to participate in the care of your patient. I will continue to follow clinically.     Paul CASTRO, [facility], [facility], [id number]  1/13/2020  11:00 AM

## 2020-01-13 NOTE — HOSPICE RN NOTE
[facility] met with daughter and patient to discuss the plans for the patient. Discharge for today was cancelled due to the need for IV pain medication. Patient was up and about in the room and the hallway. Getting oral pain medication and Fentanyl

## 2020-01-13 NOTE — DIETARY NOTE
Brief Nutrition Note:    Chart review due to 7 day LOS. Pt with pancreaticobiliary cancer with liver mets. Plan is for home with hospice today/tomorrow. No aggressive plans. Po intake ok, wt hx reviewed-- stable.  No aggressive nutritional plans therefore

## 2020-01-13 NOTE — PLAN OF CARE
IV dilaudid given X 1 for severe pain, PO dilaudid Q3-4 prn. Voiding WNL, up with walker and SBA. Gave miralax @ 2100, patient wants suppository in morning if no BM. Prophylactic mepilex to b/l heels and floated on pillow. Safety plan in place.  Attentive l pre-medicate as appropriate  Outcome: Progressing     Problem: SAFETY ADULT - FALL  Goal: Free from fall injury  Description  INTERVENTIONS:  - Assess pt frequently for physical needs  - Identify cognitive and physical deficits and behaviors that affect ri Advance diet as tolerated, if ordered  - Obtain nutritional consult as needed  - Evaluate fluid balance  Outcome: Progressing

## 2020-01-14 NOTE — PROGRESS NOTES
Pt discharge medications, paperwork sent with patient. Picked up via [facility] and discharged in stable condition.

## 2020-01-14 NOTE — PLAN OF CARE
Problem: Patient Centered Care  Goal: Patient preferences are identified and integrated in the patient's plan of care  Description  Interventions:  - What would you like us to know as we care for you?   - Provide timely, complete, and accurate informatio Problem: SAFETY ADULT - FALL  Goal: Free from fall injury  Description  INTERVENTIONS:  - Assess pt frequently for physical needs  - Identify cognitive and physical deficits and behaviors that affect risk of falls.   - [place] fall precautions as indica ordered  - Obtain nutritional consult as needed  - Evaluate fluid balance  Outcome: Adequate for Discharge  Goal: Maintains or returns to baseline bowel function  Description  INTERVENTIONS:  - Assess bowel function  - Maintain adequate hydration with IV o Initiate interventions, skin care algorithm/standards of care as needed  Outcome: Adequate for Discharge  Goal: Oral mucous membranes remain intact  Description  INTERVENTIONS  - Assess oral mucosa and hygiene practices  - Implement preventative oral hygie

## 2020-01-14 NOTE — HOSPICE RN NOTE
[facility] met with Delle Aschoff APN and Dr Kushal Torre along with patient and daughter to discuss the POC. Patient will be going home via [address] at [address] [facility] today. DME ordered and Medications have been ordered for delivery this afternoon.

## 2020-01-14 NOTE — PLAN OF CARE
Pain managed with PO dilaudid. Fentanyl patches to abdomen. Up with standby and walker. Plan to D/C home with hospice and daughter (with caregiver).       Problem: Patient Centered Care  Goal: Patient preferences are identified and integrated in the patient pain  - Anticipate increased pain with activity and pre-medicate as appropriate  Outcome: Progressing     Problem: SAFETY ADULT - FALL  Goal: Free from fall injury  Description  INTERVENTIONS:  - Assess pt frequently for physical needs  - Identify cognitiv nonpharmacologic comfort measures as appropriate  - Advance diet as tolerated, if ordered  - Obtain nutritional consult as needed  - Evaluate fluid balance  Outcome: Progressing  Goal: Maintains or returns to baseline bowel function  Description  INTERVENT areas of redness and/or skin breakdown  - Initiate interventions, skin care algorithm/standards of care as needed  Outcome: Progressing  Goal: Oral mucous membranes remain intact  Description  INTERVENTIONS  - Assess oral mucosa and hygiene practices  - Im

## 2020-01-14 NOTE — PLAN OF CARE
Plan of care reviewed with Chu Mich and her daughter. Pain medications changed due to uncontrolled pain overnight. Patient tolerating new pain regimen plan. Patient ambulating in the hallway frequently. Family visiting at bedside.  Plan for discharge home kelsy relaxation techniques  - Monitor for opioid side effects  - Notify MD/LIP if interventions unsuccessful or patient reports new pain  - Anticipate increased pain with activity and pre-medicate as appropriate  Outcome: Progressing     Problem: SAFETY ADULT - Nasogastric tube to low intermittent suction as ordered  - Evaluate effectiveness of ordered antiemetic medications  - Provide nonpharmacologic comfort measures as appropriate  - Advance diet as tolerated, if ordered  - Obtain nutritional consult as needed intact  Description  INTERVENTIONS  - Assess and document risk factors for pressure ulcer development  - Assess and document skin integrity  - Monitor for areas of redness and/or skin breakdown  - Initiate interventions, skin care algorithm/standards of ca

## 2020-01-14 NOTE — PALLIATIVE CARE NOTE
[place] [facility]  Palliative Care Follow Up    [address] Patient Status:  Inpatient    1929 MRN [id number]   Location [facility] 4W/SW/SE Attending José Manuel Alba MD   Hosp Day # 9 PCP Jennifer Soto MD     Date of Co 1/13/2020 2130  Gross per 24 hour   Intake 120 ml   Output 150 ml   Net -30 ml       Physical Exam:  General: chronically ill  Nutritional status: normally nourished  HEENT: + eye glasses  Chest appearance: Kyphosis  Cardiac: Deferred  Lungs: Normal excurs Propionate (FLONASE) 50 MCG/ACT nasal spray 1 spray, 1 spray, Each Nare, Daily PRN  •  Montelukast Sodium (SINGULAIR) tab 10 mg, 10 mg, Oral, Nightly  •  OCUVITE-LUTEIN (OCUVITE - EYE VITAMIN) tab 1 tablet, 1 tablet, Oral, Daily  •  Pantoprazole Sodium ([place] Name: Mayelin Robles (daughter)  Healthcare Agent's Phone Number: [phone]          Hospitalization:  DNR/DNI  No medically administered means of nutrition including feeding tubes    Palliative Care Assessment/Plan:    Intra-abdominal tumor

## 2020-01-16 NOTE — DISCHARGE SUMMARY
[facility]    PATIENT'S NAME: Litzy Reid   ATTENDING PHYSICIAN: Sam Simms MD   PATIENT ACCOUNT#:   [id number]    LOCATION:  [address] [place] [address] RECORD #:   [id number]       YOB: 1929  ADMISSION DATE: pressure 141/68, pulse 62, respirations 18, temperature 98 degrees. HEENT:  Pupils equally reactive, round to light. Extraocular movements were intact. Mucosa was moist.  NECK:  No masses. LUNGS:  Expiratory wheeze noted. No crackles.   HEART:  Regular

## 2020-01-27 ENCOUNTER — TELEPHONE (OUTPATIENT)
Dept: HEMATOLOGY/ONCOLOGY | Facility: HOSPITAL | Age: 85
End: 2020-01-27

## 2022-01-01 NOTE — HOSPICE RN NOTE
Attempted to call Marcial Graham dtr, regarding hospice consents. Left voicemail for callback. Statement Selected

## (undated) DEVICE — PROXIMATE SKIN STAPLERS (35 WIDE) CONTAINS 35 STAINLESS STEEL STAPLES (FIXED HEAD): Brand: PROXIMATE

## (undated) DEVICE — VIOLET BRAIDED (POLYGLACTIN 910), SYNTHETIC ABSORBABLE SUTURE: Brand: COATED VICRYL

## (undated) DEVICE — Device: Brand: STABLECUT®

## (undated) DEVICE — CHLORAPREP 26ML APPLICATOR

## (undated) DEVICE — SUTURE VICRYL 0 CP-1

## (undated) DEVICE — SUTURE ETHILON 3-0 FSL

## (undated) DEVICE — Device

## (undated) DEVICE — REM POLYHESIVE ADULT PATIENT RETURN ELECTRODE: Brand: VALLEYLAB

## (undated) DEVICE — LOWER EXTREMITY CDS-LF: Brand: MEDLINE INDUSTRIES, INC.

## (undated) DEVICE — PADDING CAST SOFT ROLL 4\" STER

## (undated) DEVICE — OCCLUSIVE GAUZE STRIP OVERWRAP,3% BISMUTH TRIBROMOPHENATE IN PETROLATUM BLEND: Brand: XEROFORM

## (undated) DEVICE — SUTURE ETHILON 3-0 PS-2

## (undated) DEVICE — SUTURE ETHILON 3-0 FS-1

## (undated) DEVICE — SUTURE VICRYL 3-0 RB-1

## (undated) DEVICE — STERILE POLYISOPRENE POWDER-FREE SURGICAL GLOVES: Brand: PROTEXIS

## (undated) DEVICE — SPLINT PLASTER 5

## (undated) DEVICE — SUTURE VICRYL 2-0 FSL

## (undated) DEVICE — ZIMMER® STERILE DISPOSABLE TOURNIQUET CUFF WITH PLC, DUAL PORT, SINGLE BLADDER, 24 IN. (61 CM)

## (undated) DEVICE — SOL  .9 1000ML BTL

## (undated) NOTE — IP AVS SNAPSHOT
Patient Demographics     Address  [address] [facility] [address] Phone  [phone] [facility])  [phone] [facility]      Emergency Contact(s)     Name Relation Home Work Edgar Son   [address] [facility] National Daughter   9 [phone]                  Your medication list      TAKE these medications       Instructions Authorizing Provider Morning Afternoon Evening As Needed   aspirin 325 MG Tabs  Next dose due:   Tonight 3/5/19 at bedtime      Take 1 tablet (325 mg total) by Commonly known as:  ULTRAM  Next dose due:  As needed after  Notes to patient:  Last dose: Take 1 tablet (50 mg total) by mouth every 6 (six) hours as needed.    aKthrine Pandey MD               Where to Get Your Medications      These medications were :  Flower Soriano DO (Physician)         [facility]  [facility] and Physical     Nilayjoeabdirashid Waller Erinn Patient Status:  Inpatient    1929 MRN [id number]   [facility] 3SW-A Attending Flower Soriano, [address] [facility] Day # 0 [facility] BILL Nasal route 2 (two) times daily. Disp: 1 Bottle Rfl: 11   Enalapril-Hydrochlorothiazide 10-25 MG Oral Tab  Disp:  Rfl: 1   Montelukast Sodium (SINGULAIR) 10 MG Oral Tab Take 1 tablet by mouth nightly.  Disp: 30 tablet Rfl: 11   loratadine (CLARITIN) 10 MG O Diagnostic Data:      Labs:  No results for input(s): WBC, HGB, MCV, PLT, BAND, INR in the last 168 hours.     Invalid input(s): LYM#, MONO#, BASOS#, EOSIN#    No results for input(s): GLU, BUN, CREATSERUM, GFRAA, GFRNAA, CA, ALB, NA, K, CL, CO2, ALKPHO, AS  1929 MRN [id number]   Location [address] [place] [address] Attending Carina Goldberg MD   Hosp Day # 1 PCP Carlos Perez MD     Date of Admission:  3/1/2019  Date of Consult:  3/2/19  Reason for Consultation:   R calcaneal tuberosity avulsion fr Metoclopramide HCl (REGLAN) injection 5 mg 5 mg Intravenous Q8H PRN   Enalapril Maleate (VASOTEC) 10 mg, hydrochlorothiazide (HYDRODIURIL) 25 mg for Vasoretic 10-25 (EEH only)  Oral Daily   hydrALAzine HCl (APRESOLINE) injection 10 mg 10 mg Intravenous Q6H Lungs: Unlabored breathing. Heart: normal peripheral perfusion  Abdomen: soft  Skin: Skin is warm and dry. RLE: splint removed. Posterior ankle skin is being tented with early signs of pressure sore. Able to move ankle/toes.   Sensation grossly intact 3/01/2019 at 16:28          Xr Wrist Complete (min 3 Views), Right (cpt=73110)    Result Date: 3/1/2019  CONCLUSION:  1. There is no acute appearing fracture or dislocation. There are degenerative changes as discussed above.     Dictated by (CST): [facility] Note signed by Zulema Sorto PT at 3/5/2019  1:19 PM  Version 1 of 1    Author:  Zulema Sorto PT Service:  — Author Type:  Physical Therapist    Filed:  3/5/2019  1:19 PM Date of Service:  3/5/2019  1:14 PM Status:  Signed    Ed -   Sitting down on and standing up from a chair with arms (e.g., wheelchair, bedside commode, etc.): A Little   -   Moving from lying on back to sitting on the side of the bed?: A Little   How much help from another person does the patient currently need. Patient End of Session: Up in chair;Needs met;Call light within reach;RN aware of session/findings; All patient questions and concerns addressed;SCDs in place    ASSESSMENT   Pt was seen for skilled PT treatment with RN approval. Pt with no c/o pain and see PHYSICAL THERAPY TREATMENT NOTE - INPATIENT    Room Number: 385/385-A     Session: 1   Number of Visits to Meet Established Goals: 3    Presenting Problem: fall, ORIF R ankle    Problem List  Active Problems:    Ankle fracture    Benign essential HTN    H -   Need to walk in hospital room?: A Lot   -   Climbing 3-5 steps with a railing?: A Lot       AM-PAC Score:  Raw Score: 16   Approx Degree of Impairment: 54.16%   Standardized Score (AM-PAC Scale): 40.78   CMS Modifier (G-Code): CK    FUNCTIONAL ABILITY Pt requires min A for transfers and ambulation for safety. Discussed with pt the importance of taking her time with activities to improve safety and reduce the risk of falling. Pt in understanding.      The AM-PAC '6-Clicks' Inpatient Basic Mobility Short F reports that she has had problems before with her ankle rolling in the past. X-ray R ankle (+)displaced fracture of the posterior and superior portion of the calcaneus. Pt had ORIF R ankle 3/2/19[[place]. 2]    Problem List  Active Problems:    Ankle fracture NEUROLOGICAL FINDINGS                      ACTIVITY TOLERANCE                         O2 WALK                  AM-PAC '6-Clicks' INPATIENT SHORT FORM - BASIC MOBILITY  How much difficulty does the patient currently have. ..  -   Turning over in bed (includi within reach;RN aware of session/findings; All patient questions and concerns addressed    ASSESSMENT   Patient is a 80year old female admitted on 3/1/2019 for[AZ.1] fall resulting in R ankle fracture, pt underwent ORIF 3/2[AZ. 2]  In this PT evaluation, th Occupational Therapy Notes (last 72 hours) (Notes from 3/2/2019  3:25 PM through 3/5/2019  3:25 PM)      Occupational Therapy Note signed by Riri Restrepo OT at 3/4/2019 11:08 AM  Version 1 of 1    Author:  Riri Restrepo OT Service:  Rehab Author -   Putting on and taking off regular lower body clothing?: A Lot  -   Bathing (including washing, rinsing, drying)?: A Lot  -   Toileting, which includes using toilet, bedpan or urinal? : A Lot  -   Putting on and taking off regular upper body clothing?: simplification techniques;ADL training;IADL training;Functional transfer training;Patient/Family education;Patient/Family training; Compensatory technique education  Rehab Potential : Good  Frequency (Obs): Daily    ADL Goals   Patient will perform lower dusty Past Medical History:   Diagnosis Date   • Migraines    • Tinnitus    • Unspecified essential hypertension        Past Surgical History  Past Surgical History:   Procedure Laterality Date   • APPENDECTOMY     • CHOLECYSTECTOMY     • HYSTERECTOMY         OC WFL[BB.1], except R LE[BB. 2]   Fine Motor    WFL      ADDITIONAL TESTS                                    NEUROLOGICAL FINDINGS                   ACTIVITY TOLERANCE                         O2 SATURATIONS                ACTIVITIES OF DAILY LIVING ASSESSME profile noted above. Functional outcome measures completed include[BB. 1] ROM and MMT[BB.2].  In this OT evaluation patient presents with the following performance deficits:[BB.1] dynamic balance, activity tolerance, R LE ROM/strength, and knowledge/use of a Patient will perform lower body dressing:  with min assist  Patient will perform toileting: with min assist[BB. 2]    Functional Transfer Goals[BB. 1]  Patient will transfer from sit to supine:  with supervision  Patient will transfer from supine to sit:  wi

## (undated) NOTE — IP AVS SNAPSHOT
Patient Demographics     Address  [place] [address] Phone  [phone] [facility])  [phone] (Mobile) *Preferred*      Emergency Contact(s)     Name Relation Home Work Edgar Munoz   [phone]    [facility] • Choose a place on the upper arm or upper body to stick the new patch. The skin should not have cuts, spots or other blemishes and not be too hairy. The area should also be dry and clean.   • Change/alternate the area where you stick the patch(es) so that • Alcohol can potentiate the side effects of fentanyl, increasing the risk of drowsiness. • Skin rashes/irritation – some people have an allergic reaction to the adhesive on the patch. • Sometimes fentanyl patches do not stick very well to the skin. bisacodyl 10 MG Supp  Commonly known as:  DULCOLAX  Next dose due:  Anytime as needed      Place 1 suppository (10 mg total) rectally daily as needed.    Estrella Solano MD         dexamethasone 4 MG tablet  Commonly known as:  DECADRON  Next dose due:  TERESO Take 30 mL by mouth daily as needed for constipation. Lennie Davis MD         Montelukast Sodium 10 MG Tabs  Commonly known as:  SINGULAIR  Next dose due:  TONIGHT      Take 1 tablet by mouth nightly. Matthew Silverman.  Preeti Kim MD         omeprazole 20 MG Cpd (HYDRODIURIL) 25 mg for Vasoretic 10-25 (EE[facility] only) 01/14/20 0954 Given      [id number] FLEET ENEMA (FLEET) 7-19 GM/118ML enema 133 mL 01/14/20 1217 Given      [id number] HYDROmorphone HCl (DILAUDID) tab 4 mg 01/13/20 1908 Given      [id number] HYDROmorphone H [facility]    PATIENT'S NAME: Saudhannah Madeline   ATTENDING PHYSICIAN: Gualberto Spencer MD   PATIENT ACCOUNT#:   [id number]    LOCATION:  [address] [place] [address] RECORD #:   [id number]       YOB: 1929  ADMISSION DATE: ABDOMEN:  Positive bowel sounds. Soft. Slight epigastric tenderness. No rebound. No guarding. EXTREMITIES:  No cyanosis, clubbing, or edema. NEUROLOGIC:  Alert, oriented x3. Cranial nerves II-XII are intact. She does move all extremities.     ASSESS quadrant and was intermittent. She states that she did notice that usually after dinner her pain would be worse. She states that activity would improve her symptoms.   She states that eventually the pain would be in the epigastric region, sometimes in the recommended to go to the emergency room for further evaluation. At the ED her chemistries are consistent with mild elevation of the LFTs.   A CT scan was performed without intravenous contrast which showed a pancreatic tail mass versus inflammatory process • Fracture surgery  03/01/2019    right heal fracture   • Hysterectomy         Allergies:  Blue Dye; Iodine  [Radiology Contrast Iodinated Dyes]; Sesame Oil; Sesame Seeds    MEDS:  No current facility-administered medications on file prior to encounter. • ferrous sulfate  325 mg Oral Daily with breakfast   • cetirizine  10 mg Oral Daily   • Montelukast Sodium  10 mg Oral Nightly   • OCUVITE-LUTEIN  1 tablet Oral Daily   • Pantoprazole Sodium  40 mg Oral QAM AC   • PEG 3350  17 g Oral Daily   • enalapril-h Lymphatics: There is no palpable lymphadenopathy throughout in the cervical, supraclavicular, axillary, or inguinal regions. Psych/Depression: nl    Laboratory Data:      No results found for this or any previous visit (from the past 24 hour(s)).       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

## (undated) NOTE — MR AVS SNAPSHOT
[facility]  [address], [address]  [phone]               Thank you for choosing us for your health care visit with Peter Rowley.  Piper Davies MD.  We are glad to serve you and happy to provide you with this summary Date Last Reviewed: 11/1/2016  © 1198-3945 The [address] [place] [address], [address] [place], [place] [address]. All rights reserved. This information is not intended as a substitute for professional medical care.  Always follow your healthcare professional the skin give the nose most of its size and shape. Other structures inside and behind the nose help you breathe. Learning the anatomy of the nose can help you better understand how the nose works.       Bone supports the upper third (bridge) of the nose.  Bharat Hurtado helps shape the nose.          The nasal cavity is a hollow space behind the nose that air flows through. The septum is a thin \"wall\" made of cartilage and bone. It divides the inside of the nose into two chambers.  The mucous membrane is thin tissue that dust and other small particles. The turbinates on each side of the nose are curved, bony ridges lined with mucous membrane. They warm and moisten the air you breathe in. The sinuses are hollow, air-filled chambers in the bone around your nose.  Mucus from t [address]. All rights reserved. This information is not intended as a substitute for professional medical care. Always follow your healthcare professional's instructions.         Understanding Nasal Anatomy: Inside View  A lot happens under the surface of the n Current Medications          This list is accurate as of: 5/22/17  8:59 PM.  Always use your most recent med list.                CALCIUM 500 + D 500-125 MG-UNIT Tabs   Generic drug:  Calcium Carbonate-Vitamin D   Take by mouth.            Enalapril-Hydroch Your unique [employer] Access Code: [email or web address]  Expires: 7/21/2017  8:59 PM    If you have questions, you can call [phone] to talk to our [facility] Staff. Remember, [employer] is NOT to be used for urgent needs. For medical emergencies, dial 911.

## (undated) NOTE — LETTER
Nadine Singh [address]  [place], [address] [facility]    Consent for Operation  Date: __________________                                Time: _______________    1.  I authorize the performance upon Tyler López the following operation:  Proc procedure has been videotaped, the surgeon will obtain the original videotape. The hospital will not be responsible for storage or maintenance of this tape.   7. For the purpose of advancing medical education, I consent to the admittance of observers to the STATEMENTS REQUIRING INSERTION OR COMPLETION WERE FILLED IN.     Signature of Patient:   ___________________________    When the patient is a minor or mentally incompetent to give consent:  Signature of person authorized to consent for patient: ____________ supplements, and pills I can buy without a prescription (including street drugs/illegal medications). Failure to inform my anesthesiologist about these medicines may increase my risk of anesthetic complications. iv.  If I am allergic to anything or have ha Anesthesiologist Signature     Date   Time  I have discussed the procedure and information above with the patient (or patient’s representative) and answered their questions. The patient or their representative has agreed to have anesthesia services.     ___

## (undated) NOTE — IP AVS SNAPSHOT
[facility]            (For Outpatient Use Only) Initial Admit Date: 1/5/2020   Inpt/Obs Admit Date: Inpt: 1/5/20 / Obs: N/A   Discharge Date:    Sree Copeland:  [de-identified]   MRN: [de-identified]   CSN: [id number]   CEID: [id number]        [facility], [facility] Subscriber Name:  Tracee Johnson :    Subscriber ID:  Pt Rel to Subscriber:    Hospital Account Financial Class: Medicare    2020

## (undated) NOTE — IP AVS SNAPSHOT
[address]            (For Outpatient Use Only) Initial Admit Date: 3/1/2019   Inpt/Obs Admit Date: Inpt: 3/1/19 / Obs: N/A   Discharge Date:    Hospital Acct:  [de-identified]   MRN: [de-identified]   CSN: [id number]        ENCOUNTER  Patient C Subscriber ID:  Pt Rel to Subscriber:    Hospital Account Financial Class: Medicare    March 5, 2019